# Patient Record
Sex: FEMALE | Race: WHITE | NOT HISPANIC OR LATINO | Employment: OTHER | URBAN - METROPOLITAN AREA
[De-identification: names, ages, dates, MRNs, and addresses within clinical notes are randomized per-mention and may not be internally consistent; named-entity substitution may affect disease eponyms.]

---

## 2020-07-22 ENCOUNTER — HISTORICAL (OUTPATIENT)
Dept: HEMATOLOGY/ONCOLOGY | Facility: CLINIC | Age: 70
End: 2020-07-22

## 2020-07-31 ENCOUNTER — HISTORICAL (OUTPATIENT)
Dept: ADMINISTRATIVE | Facility: HOSPITAL | Age: 70
End: 2020-07-31

## 2020-07-31 LAB
ABS NEUT (OLG): 3.99 X10(3)/MCL (ref 2.1–9.2)
ALBUMIN SERPL-MCNC: 4.5 GM/DL (ref 3.4–4.8)
ALBUMIN/GLOB SERPL: 1.6 RATIO (ref 1.1–2)
ALP SERPL-CCNC: 76 UNIT/L (ref 40–150)
ALT SERPL-CCNC: 17 UNIT/L (ref 0–55)
AST SERPL-CCNC: 16 UNIT/L (ref 5–34)
BASOPHILS # BLD AUTO: 0 X10(3)/MCL (ref 0–0.2)
BASOPHILS NFR BLD AUTO: 0.6 %
BILIRUB SERPL-MCNC: 1.1 MG/DL
BILIRUBIN DIRECT+TOT PNL SERPL-MCNC: 0.3 MG/DL (ref 0–0.5)
BILIRUBIN DIRECT+TOT PNL SERPL-MCNC: 0.8 MG/DL (ref 0–0.8)
BUN SERPL-MCNC: 7.8 MG/DL (ref 9.8–20.1)
CALCIUM SERPL-MCNC: 9.8 MG/DL (ref 8.4–10.2)
CANCER AG125 SERPL-ACNC: 14.6 UNIT/ML (ref 0–35)
CHLORIDE SERPL-SCNC: 103 MMOL/L (ref 98–107)
CO2 SERPL-SCNC: 29 MMOL/L (ref 23–31)
CREAT SERPL-MCNC: 0.66 MG/DL (ref 0.55–1.02)
EOSINOPHIL # BLD AUTO: 0.4 X10(3)/MCL (ref 0–0.9)
EOSINOPHIL NFR BLD AUTO: 6.4 %
ERYTHROCYTE [DISTWIDTH] IN BLOOD BY AUTOMATED COUNT: 12.4 % (ref 11.5–17)
GLOBULIN SER-MCNC: 2.8 GM/DL (ref 2.4–3.5)
GLUCOSE SERPL-MCNC: 106 MG/DL (ref 82–115)
HCT VFR BLD AUTO: 37.4 % (ref 37–47)
HGB BLD-MCNC: 12.2 GM/DL (ref 12–16)
LYMPHOCYTES # BLD AUTO: 1.4 X10(3)/MCL (ref 0.6–4.6)
LYMPHOCYTES NFR BLD AUTO: 22.3 %
MCH RBC QN AUTO: 31 PG (ref 27–31)
MCHC RBC AUTO-ENTMCNC: 32.6 GM/DL (ref 33–36)
MCV RBC AUTO: 95.2 FL (ref 80–94)
MONOCYTES # BLD AUTO: 0.4 X10(3)/MCL (ref 0.1–1.3)
MONOCYTES NFR BLD AUTO: 7 %
NEUTROPHILS # BLD AUTO: 4 X10(3)/MCL (ref 2.1–9.2)
NEUTROPHILS NFR BLD AUTO: 63.5 %
PLATELET # BLD AUTO: 226 X10(3)/MCL (ref 130–400)
PMV BLD AUTO: 10.7 FL (ref 9.4–12.4)
POTASSIUM SERPL-SCNC: 4.1 MMOL/L (ref 3.5–5.1)
PROT SERPL-MCNC: 7.3 GM/DL (ref 5.8–7.6)
RBC # BLD AUTO: 3.93 X10(6)/MCL (ref 4.2–5.4)
SODIUM SERPL-SCNC: 141 MMOL/L (ref 136–145)
WBC # SPEC AUTO: 6.3 X10(3)/MCL (ref 4.5–11.5)

## 2020-08-05 LAB — SARS-COV-2 AG RESP QL IA.RAPID: NEGATIVE

## 2020-08-07 ENCOUNTER — HISTORICAL (OUTPATIENT)
Dept: ADMINISTRATIVE | Facility: HOSPITAL | Age: 70
End: 2020-08-07

## 2020-08-18 ENCOUNTER — HISTORICAL (OUTPATIENT)
Dept: INFUSION THERAPY | Facility: HOSPITAL | Age: 70
End: 2020-08-18

## 2020-08-18 LAB
ABS NEUT (OLG): 4.04 X10(3)/MCL (ref 2.1–9.2)
ANION GAP SERPL CALC-SCNC: 18 MMOL/L
BASOPHILS # BLD AUTO: 0 X10(3)/MCL (ref 0–0.2)
BASOPHILS NFR BLD AUTO: 0.8 %
BUN SERPL-MCNC: 7 MG/DL (ref 8–26)
CHLORIDE SERPL-SCNC: 98 MMOL/L (ref 98–109)
CREAT SERPL-MCNC: 0.6 MG/DL (ref 0.6–1.3)
EOSINOPHIL # BLD AUTO: 0 X10(3)/MCL (ref 0–0.9)
EOSINOPHIL NFR BLD AUTO: 0.2 %
ERYTHROCYTE [DISTWIDTH] IN BLOOD BY AUTOMATED COUNT: 12.6 % (ref 11.5–17)
GLUCOSE SERPL-MCNC: 147 MG/DL (ref 70–105)
HCT VFR BLD AUTO: 35.9 % (ref 37–47)
HCT VFR BLD CALC: 37 % (ref 38–51)
HGB BLD-MCNC: 11.8 GM/DL (ref 12–16)
HGB BLD-MCNC: 12.6 MG/DL (ref 12–17)
LYMPHOCYTES # BLD AUTO: 0.7 X10(3)/MCL (ref 0.6–4.6)
LYMPHOCYTES NFR BLD AUTO: 13.9 %
MCH RBC QN AUTO: 30.8 PG (ref 27–31)
MCHC RBC AUTO-ENTMCNC: 32.9 GM/DL (ref 33–36)
MCV RBC AUTO: 93.7 FL (ref 80–94)
MONOCYTES # BLD AUTO: 0.2 X10(3)/MCL (ref 0.1–1.3)
MONOCYTES NFR BLD AUTO: 4.2 %
NEUTROPHILS # BLD AUTO: 4 X10(3)/MCL (ref 2.1–9.2)
NEUTROPHILS NFR BLD AUTO: 80.1 %
PLATELET # BLD AUTO: 244 X10(3)/MCL (ref 130–400)
PMV BLD AUTO: 10 FL (ref 9.4–12.4)
POC IONIZED CALCIUM: 1.21 MMOL/L (ref 1.12–1.32)
POC TCO2: 25 MMOL/L (ref 24–29)
POTASSIUM BLD-SCNC: 3.6 MMOL/L (ref 3.5–4.9)
RBC # BLD AUTO: 3.83 X10(6)/MCL (ref 4.2–5.4)
SODIUM BLD-SCNC: 136 MMOL/L (ref 138–146)
WBC # SPEC AUTO: 5 X10(3)/MCL (ref 4.5–11.5)

## 2020-08-25 ENCOUNTER — HISTORICAL (OUTPATIENT)
Dept: ADMINISTRATIVE | Facility: HOSPITAL | Age: 70
End: 2020-08-25

## 2020-08-25 LAB
ABS NEUT (OLG): 9.71 X10(3)/MCL (ref 2.1–9.2)
ALBUMIN SERPL-MCNC: 4.2 GM/DL (ref 3.4–5)
ALBUMIN/GLOB SERPL: 1.7 RATIO (ref 1.1–2)
ALP SERPL-CCNC: 110 UNIT/L (ref 40–150)
ALT SERPL-CCNC: 16 UNIT/L (ref 0–55)
AST SERPL-CCNC: 14 UNIT/L (ref 5–34)
BASOPHILS # BLD AUTO: 0 X10(3)/MCL (ref 0–0.2)
BASOPHILS NFR BLD AUTO: 0.3 %
BILIRUB SERPL-MCNC: 0.4 MG/DL
BILIRUBIN DIRECT+TOT PNL SERPL-MCNC: 0.1 MG/DL (ref 0–0.5)
BILIRUBIN DIRECT+TOT PNL SERPL-MCNC: 0.3 MG/DL (ref 0–0.8)
BUN SERPL-MCNC: 7.8 MG/DL (ref 9.8–20.1)
CALCIUM SERPL-MCNC: 8.5 MG/DL (ref 8.4–10.2)
CHLORIDE SERPL-SCNC: 96 MMOL/L (ref 98–107)
CO2 SERPL-SCNC: 28 MMOL/L (ref 23–31)
CREAT SERPL-MCNC: 0.63 MG/DL (ref 0.55–1.02)
EOSINOPHIL # BLD AUTO: 0.7 X10(3)/MCL (ref 0–0.9)
EOSINOPHIL NFR BLD AUTO: 4.7 %
ERYTHROCYTE [DISTWIDTH] IN BLOOD BY AUTOMATED COUNT: 12.9 % (ref 11.5–17)
GLOBULIN SER-MCNC: 2.5 GM/DL (ref 2.4–3.5)
GLUCOSE SERPL-MCNC: 98 MG/DL (ref 82–115)
HCT VFR BLD AUTO: 36 % (ref 37–47)
HGB BLD-MCNC: 12 GM/DL (ref 12–16)
LYMPHOCYTES # BLD AUTO: 1.5 X10(3)/MCL (ref 0.6–4.6)
LYMPHOCYTES NFR BLD AUTO: 10.4 %
MCH RBC QN AUTO: 31.1 PG (ref 27–31)
MCHC RBC AUTO-ENTMCNC: 33.3 GM/DL (ref 33–36)
MCV RBC AUTO: 93.3 FL (ref 80–94)
MONOCYTES # BLD AUTO: 1.9 X10(3)/MCL (ref 0.1–1.3)
MONOCYTES NFR BLD AUTO: 13.4 %
NEUTROPHILS # BLD AUTO: 9.7 X10(3)/MCL (ref 2.1–9.2)
NEUTROPHILS NFR BLD AUTO: 67.7 %
PLATELET # BLD AUTO: 185 X10(3)/MCL (ref 130–400)
PMV BLD AUTO: 10.3 FL (ref 9.4–12.4)
POTASSIUM SERPL-SCNC: 4.1 MMOL/L (ref 3.5–5.1)
PROT SERPL-MCNC: 6.7 GM/DL (ref 5.8–7.6)
RBC # BLD AUTO: 3.86 X10(6)/MCL (ref 4.2–5.4)
SODIUM SERPL-SCNC: 132 MMOL/L (ref 136–145)
WBC # SPEC AUTO: 14.4 X10(3)/MCL (ref 4.5–11.5)

## 2020-09-01 ENCOUNTER — HISTORICAL (OUTPATIENT)
Dept: HEMATOLOGY/ONCOLOGY | Facility: CLINIC | Age: 70
End: 2020-09-01

## 2020-09-01 LAB
ABS NEUT (OLG): 7.99 X10(3)/MCL (ref 2.1–9.2)
ALBUMIN SERPL-MCNC: 4.1 GM/DL (ref 3.4–5)
ALBUMIN/GLOB SERPL: 1.7 RATIO (ref 1.1–2)
ALP SERPL-CCNC: 113 UNIT/L (ref 40–150)
ALT SERPL-CCNC: 21 UNIT/L (ref 0–55)
AST SERPL-CCNC: 15 UNIT/L (ref 5–34)
BASOPHILS # BLD AUTO: 0.1 X10(3)/MCL (ref 0–0.2)
BASOPHILS NFR BLD AUTO: 0.9 %
BILIRUB SERPL-MCNC: 0.5 MG/DL
BILIRUBIN DIRECT+TOT PNL SERPL-MCNC: 0.1 MG/DL (ref 0–0.5)
BILIRUBIN DIRECT+TOT PNL SERPL-MCNC: 0.4 MG/DL (ref 0–0.8)
BUN SERPL-MCNC: 7.3 MG/DL (ref 9.8–20.1)
CALCIUM SERPL-MCNC: 8.6 MG/DL (ref 8.4–10.2)
CHLORIDE SERPL-SCNC: 102 MMOL/L (ref 98–107)
CO2 SERPL-SCNC: 30 MMOL/L (ref 23–31)
CREAT SERPL-MCNC: 0.66 MG/DL (ref 0.55–1.02)
EOSINOPHIL # BLD AUTO: 0.2 X10(3)/MCL (ref 0–0.9)
EOSINOPHIL NFR BLD AUTO: 2 %
ERYTHROCYTE [DISTWIDTH] IN BLOOD BY AUTOMATED COUNT: 13.6 % (ref 11.5–17)
GLOBULIN SER-MCNC: 2.4 GM/DL (ref 2.4–3.5)
GLUCOSE SERPL-MCNC: 79 MG/DL (ref 82–115)
HCT VFR BLD AUTO: 35.6 % (ref 37–47)
HGB BLD-MCNC: 11.7 GM/DL (ref 12–16)
LYMPHOCYTES # BLD AUTO: 1.6 X10(3)/MCL (ref 0.6–4.6)
LYMPHOCYTES NFR BLD AUTO: 14.8 %
MCH RBC QN AUTO: 30.7 PG (ref 27–31)
MCHC RBC AUTO-ENTMCNC: 32.9 GM/DL (ref 33–36)
MCV RBC AUTO: 93.4 FL (ref 80–94)
MONOCYTES # BLD AUTO: 0.6 X10(3)/MCL (ref 0.1–1.3)
MONOCYTES NFR BLD AUTO: 6 %
NEUTROPHILS # BLD AUTO: 8 X10(3)/MCL (ref 2.1–9.2)
NEUTROPHILS NFR BLD AUTO: 75.5 %
PLATELET # BLD AUTO: 168 X10(3)/MCL (ref 130–400)
PMV BLD AUTO: 9.6 FL (ref 9.4–12.4)
POTASSIUM SERPL-SCNC: 4 MMOL/L (ref 3.5–5.1)
PROT SERPL-MCNC: 6.5 GM/DL (ref 5.8–7.6)
RBC # BLD AUTO: 3.81 X10(6)/MCL (ref 4.2–5.4)
SODIUM SERPL-SCNC: 141 MMOL/L (ref 136–145)
WBC # SPEC AUTO: 10.6 X10(3)/MCL (ref 4.5–11.5)

## 2020-09-08 ENCOUNTER — HISTORICAL (OUTPATIENT)
Dept: INFUSION THERAPY | Facility: HOSPITAL | Age: 70
End: 2020-09-08

## 2020-09-08 LAB
ABS NEUT (OLG): 4.63 X10(3)/MCL (ref 2.1–9.2)
ANION GAP SERPL CALC-SCNC: 18 MMOL/L
BASOPHILS # BLD AUTO: 0 X10(3)/MCL (ref 0–0.2)
BASOPHILS NFR BLD AUTO: 0.3 %
BUN SERPL-MCNC: 9 MG/DL (ref 8–26)
CHLORIDE SERPL-SCNC: 100 MMOL/L (ref 98–109)
CREAT SERPL-MCNC: 0.5 MG/DL (ref 0.6–1.3)
ERYTHROCYTE [DISTWIDTH] IN BLOOD BY AUTOMATED COUNT: 13.5 % (ref 11.5–17)
GLUCOSE SERPL-MCNC: 153 MG/DL (ref 70–105)
HCT VFR BLD AUTO: 34.9 % (ref 37–47)
HCT VFR BLD CALC: 38 % (ref 38–51)
HGB BLD-MCNC: 11.6 GM/DL (ref 12–16)
HGB BLD-MCNC: 12.9 MG/DL (ref 12–17)
LYMPHOCYTES # BLD AUTO: 0.9 X10(3)/MCL (ref 0.6–4.6)
LYMPHOCYTES NFR BLD AUTO: 14.9 %
MCH RBC QN AUTO: 30.6 PG (ref 27–31)
MCHC RBC AUTO-ENTMCNC: 33.2 GM/DL (ref 33–36)
MCV RBC AUTO: 92.1 FL (ref 80–94)
MONOCYTES # BLD AUTO: 0.2 X10(3)/MCL (ref 0.1–1.3)
MONOCYTES NFR BLD AUTO: 4.3 %
NEUTROPHILS # BLD AUTO: 4.6 X10(3)/MCL (ref 2.1–9.2)
NEUTROPHILS NFR BLD AUTO: 80.3 %
PLATELET # BLD AUTO: 290 X10(3)/MCL (ref 130–400)
PMV BLD AUTO: 9.8 FL (ref 9.4–12.4)
POC IONIZED CALCIUM: 1.29 MMOL/L (ref 1.12–1.32)
POC TCO2: 25 MMOL/L (ref 24–29)
POTASSIUM BLD-SCNC: 3.8 MMOL/L (ref 3.5–4.9)
RBC # BLD AUTO: 3.79 X10(6)/MCL (ref 4.2–5.4)
SODIUM BLD-SCNC: 138 MMOL/L (ref 138–146)
WBC # SPEC AUTO: 5.8 X10(3)/MCL (ref 4.5–11.5)

## 2020-09-29 ENCOUNTER — HISTORICAL (OUTPATIENT)
Dept: INFUSION THERAPY | Facility: HOSPITAL | Age: 70
End: 2020-09-29

## 2020-09-29 LAB
ABS NEUT (OLG): 3.55 X10(3)/MCL (ref 2.1–9.2)
ANION GAP SERPL CALC-SCNC: 17 MMOL/L
BASOPHILS # BLD AUTO: 0.1 X10(3)/MCL (ref 0–0.2)
BASOPHILS NFR BLD AUTO: 1.1 %
BUN SERPL-MCNC: 8 MG/DL (ref 8–26)
CHLORIDE SERPL-SCNC: 102 MMOL/L (ref 98–109)
CREAT SERPL-MCNC: 0.6 MG/DL (ref 0.6–1.3)
EOSINOPHIL # BLD AUTO: 0.1 X10(3)/MCL (ref 0–0.9)
EOSINOPHIL NFR BLD AUTO: 1.8 %
ERYTHROCYTE [DISTWIDTH] IN BLOOD BY AUTOMATED COUNT: 14.3 % (ref 11.5–17)
GLUCOSE SERPL-MCNC: 103 MG/DL (ref 70–105)
HCT VFR BLD AUTO: 33.4 % (ref 37–47)
HCT VFR BLD CALC: 35 % (ref 38–51)
HGB BLD-MCNC: 11.3 GM/DL (ref 12–16)
HGB BLD-MCNC: 11.9 MG/DL (ref 12–17)
LYMPHOCYTES # BLD AUTO: 1.4 X10(3)/MCL (ref 0.6–4.6)
LYMPHOCYTES NFR BLD AUTO: 25 %
MCH RBC QN AUTO: 31.7 PG (ref 27–31)
MCHC RBC AUTO-ENTMCNC: 33.8 GM/DL (ref 33–36)
MCV RBC AUTO: 93.6 FL (ref 80–94)
MONOCYTES # BLD AUTO: 0.5 X10(3)/MCL (ref 0.1–1.3)
MONOCYTES NFR BLD AUTO: 9.5 %
NEUTROPHILS # BLD AUTO: 3.6 X10(3)/MCL (ref 2.1–9.2)
NEUTROPHILS NFR BLD AUTO: 62.4 %
PLATELET # BLD AUTO: 195 X10(3)/MCL (ref 130–400)
PMV BLD AUTO: 9.5 FL (ref 9.4–12.4)
POC IONIZED CALCIUM: 1.25 MMOL/L (ref 1.12–1.32)
POC TCO2: 26 MMOL/L (ref 24–29)
POTASSIUM BLD-SCNC: 4.3 MMOL/L (ref 3.5–4.9)
RBC # BLD AUTO: 3.57 X10(6)/MCL (ref 4.2–5.4)
SODIUM BLD-SCNC: 140 MMOL/L (ref 138–146)
WBC # SPEC AUTO: 5.7 X10(3)/MCL (ref 4.5–11.5)

## 2020-10-16 ENCOUNTER — HISTORICAL (OUTPATIENT)
Dept: RADIOLOGY | Facility: HOSPITAL | Age: 70
End: 2020-10-16

## 2020-10-20 ENCOUNTER — HISTORICAL (OUTPATIENT)
Dept: INFUSION THERAPY | Facility: HOSPITAL | Age: 70
End: 2020-10-20

## 2020-10-20 LAB
ABS NEUT (OLG): 2.32 X10(3)/MCL (ref 2.1–9.2)
ALBUMIN SERPL-MCNC: 4.2 GM/DL (ref 3.4–4.8)
ALP SERPL-CCNC: 102 UNIT/L (ref 40–150)
ALT SERPL-CCNC: 14 UNIT/L (ref 0–55)
ANION GAP SERPL CALC-SCNC: NORMAL MMOL/L
AST SERPL-CCNC: 16 UNIT/L (ref 5–34)
BASOPHILS # BLD AUTO: 0 X10(3)/MCL (ref 0–0.2)
BASOPHILS NFR BLD AUTO: 1 %
BILIRUB SERPL-MCNC: 0.7 MG/DL
BILIRUBIN DIRECT+TOT PNL SERPL-MCNC: 0.2 MG/DL (ref 0–0.5)
BILIRUBIN DIRECT+TOT PNL SERPL-MCNC: 0.5 MG/DL (ref 0–0.8)
BUN SERPL-MCNC: 8 MG/DL (ref 8–26)
CHLORIDE SERPL-SCNC: NORMAL MMOL/L (ref 98–109)
CREAT SERPL-MCNC: 0.6 MG/DL (ref 0.6–1.3)
EOSINOPHIL # BLD AUTO: 0.1 X10(3)/MCL (ref 0–0.9)
EOSINOPHIL NFR BLD AUTO: 1.8 %
ERYTHROCYTE [DISTWIDTH] IN BLOOD BY AUTOMATED COUNT: 14.6 % (ref 11.5–17)
GLUCOSE SERPL-MCNC: 97 MG/DL (ref 70–105)
HCT VFR BLD AUTO: 34.8 % (ref 37–47)
HCT VFR BLD CALC: NORMAL % (ref 38–51)
HGB BLD-MCNC: 11.6 GM/DL (ref 12–16)
HGB BLD-MCNC: NORMAL MG/DL (ref 12–17)
LIVER PROFILE INTERP: NORMAL
LYMPHOCYTES # BLD AUTO: 1.2 X10(3)/MCL (ref 0.6–4.6)
LYMPHOCYTES NFR BLD AUTO: 28.8 %
MCH RBC QN AUTO: 31.9 PG (ref 27–31)
MCHC RBC AUTO-ENTMCNC: 33.3 GM/DL (ref 33–36)
MCV RBC AUTO: 95.6 FL (ref 80–94)
MONOCYTES # BLD AUTO: 0.4 X10(3)/MCL (ref 0.1–1.3)
MONOCYTES NFR BLD AUTO: 10 %
NEUTROPHILS # BLD AUTO: 2.3 X10(3)/MCL (ref 2.1–9.2)
NEUTROPHILS NFR BLD AUTO: 58.1 %
PLATELET # BLD AUTO: 221 X10(3)/MCL (ref 130–400)
PMV BLD AUTO: 10.1 FL (ref 9.4–12.4)
POC IONIZED CALCIUM: 1.28 MMOL/L (ref 1.12–1.32)
POC TCO2: 26 MMOL/L (ref 24–29)
POTASSIUM BLD-SCNC: 3.6 MMOL/L (ref 3.5–4.9)
PROT SERPL-MCNC: 7 GM/DL (ref 5.8–7.6)
RBC # BLD AUTO: 3.64 X10(6)/MCL (ref 4.2–5.4)
SODIUM BLD-SCNC: 140 MMOL/L (ref 138–146)
WBC # SPEC AUTO: 4 X10(3)/MCL (ref 4.5–11.5)

## 2020-11-11 ENCOUNTER — HISTORICAL (OUTPATIENT)
Dept: INFUSION THERAPY | Facility: HOSPITAL | Age: 70
End: 2020-11-11

## 2020-11-11 LAB
ABS NEUT (OLG): 3.02 X10(3)/MCL (ref 2.1–9.2)
ANION GAP SERPL CALC-SCNC: 17 MMOL/L
BASOPHILS # BLD AUTO: 0 X10(3)/MCL (ref 0–0.2)
BASOPHILS NFR BLD AUTO: 1 %
BUN SERPL-MCNC: 8 MG/DL (ref 8–26)
CANCER AG125 SERPL-ACNC: 7 UNIT/ML (ref 0–35)
CHLORIDE SERPL-SCNC: 98 MMOL/L (ref 98–109)
CREAT SERPL-MCNC: 0.6 MG/DL (ref 0.6–1.3)
EOSINOPHIL # BLD AUTO: 0.1 X10(3)/MCL (ref 0–0.9)
EOSINOPHIL NFR BLD AUTO: 1.7 %
ERYTHROCYTE [DISTWIDTH] IN BLOOD BY AUTOMATED COUNT: 14.2 % (ref 11.5–17)
GLUCOSE SERPL-MCNC: 139 MG/DL (ref 70–105)
HCT VFR BLD AUTO: 31.6 % (ref 37–47)
HCT VFR BLD CALC: 33 % (ref 38–51)
HGB BLD-MCNC: 10.7 GM/DL (ref 12–16)
HGB BLD-MCNC: 11.2 MG/DL (ref 12–17)
LYMPHOCYTES # BLD AUTO: 1.2 X10(3)/MCL (ref 0.6–4.6)
LYMPHOCYTES NFR BLD AUTO: 24.7 %
MAGNESIUM SERPL-MCNC: 1.9 MG/DL (ref 1.6–2.6)
MCH RBC QN AUTO: 32.9 PG (ref 27–31)
MCHC RBC AUTO-ENTMCNC: 33.9 GM/DL (ref 33–36)
MCV RBC AUTO: 97.2 FL (ref 80–94)
MONOCYTES # BLD AUTO: 0.5 X10(3)/MCL (ref 0.1–1.3)
MONOCYTES NFR BLD AUTO: 9.8 %
NEUTROPHILS # BLD AUTO: 3 X10(3)/MCL (ref 2.1–9.2)
NEUTROPHILS NFR BLD AUTO: 62.8 %
PHOSPHATE SERPL-MCNC: 3.9 MG/DL (ref 2.3–4.7)
PLATELET # BLD AUTO: 193 X10(3)/MCL (ref 130–400)
PMV BLD AUTO: 10.1 FL (ref 9.4–12.4)
POC IONIZED CALCIUM: 1.27 MMOL/L (ref 1.12–1.32)
POC TCO2: 28 MMOL/L (ref 24–29)
POTASSIUM BLD-SCNC: 3.4 MMOL/L (ref 3.5–4.9)
RBC # BLD AUTO: 3.25 X10(6)/MCL (ref 4.2–5.4)
SODIUM BLD-SCNC: 139 MMOL/L (ref 138–146)
WBC # SPEC AUTO: 4.8 X10(3)/MCL (ref 4.5–11.5)

## 2020-12-01 ENCOUNTER — HISTORICAL (OUTPATIENT)
Dept: ADMINISTRATIVE | Facility: HOSPITAL | Age: 70
End: 2020-12-01

## 2020-12-01 LAB
ABS NEUT (OLG): 4.79 X10(3)/MCL (ref 2.1–9.2)
ALBUMIN SERPL-MCNC: 4.1 GM/DL (ref 3.4–4.8)
ALBUMIN/GLOB SERPL: 1.7 RATIO (ref 1.1–2)
ALP SERPL-CCNC: 94 UNIT/L (ref 40–150)
ALT SERPL-CCNC: 17 UNIT/L (ref 0–55)
AST SERPL-CCNC: 17 UNIT/L (ref 5–34)
BASOPHILS # BLD AUTO: 0 X10(3)/MCL (ref 0–0.2)
BASOPHILS NFR BLD AUTO: 0.6 %
BILIRUB SERPL-MCNC: 0.9 MG/DL
BILIRUBIN DIRECT+TOT PNL SERPL-MCNC: 0.3 MG/DL (ref 0–0.5)
BILIRUBIN DIRECT+TOT PNL SERPL-MCNC: 0.6 MG/DL (ref 0–0.8)
BUN SERPL-MCNC: 8.4 MG/DL (ref 9.8–20.1)
CALCIUM SERPL-MCNC: 9.2 MG/DL (ref 8.4–10.2)
CANCER AG125 SERPL-ACNC: 6.8 UNIT/ML (ref 0–35)
CHLORIDE SERPL-SCNC: 105 MMOL/L (ref 98–107)
CO2 SERPL-SCNC: 29 MMOL/L (ref 23–31)
CREAT SERPL-MCNC: 0.66 MG/DL (ref 0.55–1.02)
EOSINOPHIL # BLD AUTO: 0 X10(3)/MCL (ref 0–0.9)
EOSINOPHIL NFR BLD AUTO: 0.8 %
ERYTHROCYTE [DISTWIDTH] IN BLOOD BY AUTOMATED COUNT: 13.5 % (ref 11.5–17)
GLOBULIN SER-MCNC: 2.4 GM/DL (ref 2.4–3.5)
GLUCOSE SERPL-MCNC: 120 MG/DL (ref 82–115)
HCT VFR BLD AUTO: 31.3 % (ref 37–47)
HGB BLD-MCNC: 10.4 GM/DL (ref 12–16)
LYMPHOCYTES # BLD AUTO: 1.3 X10(3)/MCL (ref 0.6–4.6)
LYMPHOCYTES NFR BLD AUTO: 19.1 %
MCH RBC QN AUTO: 32.9 PG (ref 27–31)
MCHC RBC AUTO-ENTMCNC: 33.2 GM/DL (ref 33–36)
MCV RBC AUTO: 99.1 FL (ref 80–94)
MONOCYTES # BLD AUTO: 0.4 X10(3)/MCL (ref 0.1–1.3)
MONOCYTES NFR BLD AUTO: 6.8 %
NEUTROPHILS # BLD AUTO: 4.8 X10(3)/MCL (ref 2.1–9.2)
NEUTROPHILS NFR BLD AUTO: 72.5 %
PLATELET # BLD AUTO: 196 X10(3)/MCL (ref 130–400)
PMV BLD AUTO: 9.4 FL (ref 9.4–12.4)
POTASSIUM SERPL-SCNC: 3.7 MMOL/L (ref 3.5–5.1)
PROT SERPL-MCNC: 6.5 GM/DL (ref 5.8–7.6)
RBC # BLD AUTO: 3.16 X10(6)/MCL (ref 4.2–5.4)
SODIUM SERPL-SCNC: 142 MMOL/L (ref 136–145)
WBC # SPEC AUTO: 6.6 X10(3)/MCL (ref 4.5–11.5)

## 2020-12-02 ENCOUNTER — HISTORICAL (OUTPATIENT)
Dept: INFUSION THERAPY | Facility: HOSPITAL | Age: 70
End: 2020-12-02

## 2020-12-18 ENCOUNTER — HISTORICAL (OUTPATIENT)
Dept: ADMINISTRATIVE | Facility: HOSPITAL | Age: 70
End: 2020-12-18

## 2020-12-22 ENCOUNTER — HISTORICAL (OUTPATIENT)
Dept: ADMINISTRATIVE | Facility: HOSPITAL | Age: 70
End: 2020-12-22

## 2020-12-22 LAB
ABS NEUT (OLG): 3.21 X10(3)/MCL (ref 2.1–9.2)
ALBUMIN SERPL-MCNC: 4.3 GM/DL (ref 3.4–4.8)
ALP SERPL-CCNC: 101 UNIT/L (ref 40–150)
ALT SERPL-CCNC: 14 UNIT/L (ref 0–55)
ANION GAP SERPL CALC-SCNC: 16 MMOL/L
AST SERPL-CCNC: 16 UNIT/L (ref 5–34)
BASOPHILS # BLD AUTO: 0 X10(3)/MCL (ref 0–0.2)
BASOPHILS NFR BLD AUTO: 0.8 %
BILIRUB SERPL-MCNC: 0.4 MG/DL
BILIRUBIN DIRECT+TOT PNL SERPL-MCNC: 0.2 MG/DL (ref 0–0.5)
BILIRUBIN DIRECT+TOT PNL SERPL-MCNC: 0.2 MG/DL (ref 0–0.8)
BUN SERPL-MCNC: 9 MG/DL (ref 8–26)
CHLORIDE SERPL-SCNC: 103 MMOL/L (ref 98–109)
CREAT SERPL-MCNC: 0.5 MG/DL (ref 0.6–1.3)
EOSINOPHIL # BLD AUTO: 0.1 X10(3)/MCL (ref 0–0.9)
EOSINOPHIL NFR BLD AUTO: 1.1 %
ERYTHROCYTE [DISTWIDTH] IN BLOOD BY AUTOMATED COUNT: 12.7 % (ref 11.5–17)
GLUCOSE SERPL-MCNC: 110 MG/DL (ref 70–105)
HCT VFR BLD AUTO: 32.7 % (ref 37–47)
HCT VFR BLD CALC: 33 % (ref 38–51)
HGB BLD-MCNC: 10.7 GM/DL (ref 12–16)
HGB BLD-MCNC: 11.2 MG/DL (ref 12–17)
LIVER PROFILE INTERP: NORMAL
LYMPHOCYTES # BLD AUTO: 1.5 X10(3)/MCL (ref 0.6–4.6)
LYMPHOCYTES NFR BLD AUTO: 28.4 %
MCH RBC QN AUTO: 32.8 PG (ref 27–31)
MCHC RBC AUTO-ENTMCNC: 32.7 GM/DL (ref 33–36)
MCV RBC AUTO: 100.3 FL (ref 80–94)
MONOCYTES # BLD AUTO: 0.5 X10(3)/MCL (ref 0.1–1.3)
MONOCYTES NFR BLD AUTO: 8.7 %
NEUTROPHILS # BLD AUTO: 3.2 X10(3)/MCL (ref 2.1–9.2)
NEUTROPHILS NFR BLD AUTO: 60.8 %
PLATELET # BLD AUTO: 248 X10(3)/MCL (ref 130–400)
PMV BLD AUTO: 9.4 FL (ref 9.4–12.4)
POC IONIZED CALCIUM: 1.27 MMOL/L (ref 1.12–1.32)
POC TCO2: 27 MMOL/L (ref 24–29)
POTASSIUM BLD-SCNC: 3.6 MMOL/L (ref 3.5–4.9)
PROT SERPL-MCNC: 6.8 GM/DL (ref 5.8–7.6)
RBC # BLD AUTO: 3.26 X10(6)/MCL (ref 4.2–5.4)
SODIUM BLD-SCNC: 141 MMOL/L (ref 138–146)
WBC # SPEC AUTO: 5.3 X10(3)/MCL (ref 4.5–11.5)

## 2021-01-21 ENCOUNTER — HISTORICAL (OUTPATIENT)
Dept: INFUSION THERAPY | Facility: HOSPITAL | Age: 71
End: 2021-01-21

## 2021-02-23 ENCOUNTER — HISTORICAL (OUTPATIENT)
Dept: HEMATOLOGY/ONCOLOGY | Facility: CLINIC | Age: 71
End: 2021-02-23

## 2021-02-23 LAB
ABS NEUT (OLG): 3.25 X10(3)/MCL (ref 2.1–9.2)
ALBUMIN SERPL-MCNC: 4.4 GM/DL (ref 3.4–4.8)
ALBUMIN/GLOB SERPL: 1.6 RATIO (ref 1.1–2)
ALP SERPL-CCNC: 90 UNIT/L (ref 40–150)
ALT SERPL-CCNC: 19 UNIT/L (ref 0–55)
AST SERPL-CCNC: 21 UNIT/L (ref 5–34)
BASOPHILS # BLD AUTO: 0 X10(3)/MCL (ref 0–0.2)
BASOPHILS NFR BLD AUTO: 0.7 %
BILIRUB SERPL-MCNC: 0.8 MG/DL
BILIRUBIN DIRECT+TOT PNL SERPL-MCNC: 0.3 MG/DL (ref 0–0.5)
BILIRUBIN DIRECT+TOT PNL SERPL-MCNC: 0.5 MG/DL (ref 0–0.8)
BUN SERPL-MCNC: 11.8 MG/DL (ref 9.8–20.1)
CALCIUM SERPL-MCNC: 9.7 MG/DL (ref 8.4–10.2)
CANCER AG125 SERPL-ACNC: 5 UNIT/ML (ref 0–35)
CHLORIDE SERPL-SCNC: 101 MMOL/L (ref 98–107)
CO2 SERPL-SCNC: 32 MMOL/L (ref 23–31)
CREAT SERPL-MCNC: 0.73 MG/DL (ref 0.55–1.02)
EOSINOPHIL # BLD AUTO: 0.5 X10(3)/MCL (ref 0–0.9)
EOSINOPHIL NFR BLD AUTO: 7.8 %
ERYTHROCYTE [DISTWIDTH] IN BLOOD BY AUTOMATED COUNT: 11.5 % (ref 11.5–17)
GLOBULIN SER-MCNC: 2.7 GM/DL (ref 2.4–3.5)
GLUCOSE SERPL-MCNC: 100 MG/DL (ref 82–115)
HCT VFR BLD AUTO: 34.6 % (ref 37–47)
HGB BLD-MCNC: 11.7 GM/DL (ref 12–16)
LYMPHOCYTES # BLD AUTO: 1.6 X10(3)/MCL (ref 0.6–4.6)
LYMPHOCYTES NFR BLD AUTO: 27.5 %
MCH RBC QN AUTO: 32.4 PG (ref 27–31)
MCHC RBC AUTO-ENTMCNC: 33.8 GM/DL (ref 33–36)
MCV RBC AUTO: 95.8 FL (ref 80–94)
MONOCYTES # BLD AUTO: 0.5 X10(3)/MCL (ref 0.1–1.3)
MONOCYTES NFR BLD AUTO: 8.7 %
NEUTROPHILS # BLD AUTO: 3.2 X10(3)/MCL (ref 2.1–9.2)
NEUTROPHILS NFR BLD AUTO: 55.1 %
PLATELET # BLD AUTO: 198 X10(3)/MCL (ref 130–400)
PMV BLD AUTO: 9.6 FL (ref 9.4–12.4)
POTASSIUM SERPL-SCNC: 3.7 MMOL/L (ref 3.5–5.1)
PROT SERPL-MCNC: 7.1 GM/DL (ref 5.8–7.6)
RBC # BLD AUTO: 3.61 X10(6)/MCL (ref 4.2–5.4)
SODIUM SERPL-SCNC: 142 MMOL/L (ref 136–145)
WBC # SPEC AUTO: 5.9 X10(3)/MCL (ref 4.5–11.5)

## 2021-03-11 ENCOUNTER — HISTORICAL (OUTPATIENT)
Dept: RADIOLOGY | Facility: HOSPITAL | Age: 71
End: 2021-03-11

## 2021-03-19 ENCOUNTER — HISTORICAL (OUTPATIENT)
Dept: INFUSION THERAPY | Facility: HOSPITAL | Age: 71
End: 2021-03-19

## 2021-04-01 ENCOUNTER — HISTORICAL (OUTPATIENT)
Dept: HEMATOLOGY/ONCOLOGY | Facility: CLINIC | Age: 71
End: 2021-04-01

## 2021-04-01 LAB
ABS NEUT (OLG): 1.49 X10(3)/MCL (ref 2.1–9.2)
ALBUMIN SERPL-MCNC: 4.5 GM/DL (ref 3.4–4.8)
ALBUMIN/GLOB SERPL: 1.9 RATIO (ref 1.1–2)
ALP SERPL-CCNC: 84 UNIT/L (ref 40–150)
ALT SERPL-CCNC: 15 UNIT/L (ref 0–55)
AST SERPL-CCNC: 21 UNIT/L (ref 5–34)
BASOPHILS # BLD AUTO: 0 X10(3)/MCL (ref 0–0.2)
BASOPHILS NFR BLD AUTO: 0.3 %
BILIRUB SERPL-MCNC: 0.9 MG/DL
BILIRUBIN DIRECT+TOT PNL SERPL-MCNC: 0.3 MG/DL (ref 0–0.5)
BILIRUBIN DIRECT+TOT PNL SERPL-MCNC: 0.6 MG/DL (ref 0–0.8)
BUN SERPL-MCNC: 8.6 MG/DL (ref 9.8–20.1)
CALCIUM SERPL-MCNC: 9.6 MG/DL (ref 8.4–10.2)
CHLORIDE SERPL-SCNC: 103 MMOL/L (ref 98–107)
CO2 SERPL-SCNC: 30 MMOL/L (ref 23–31)
CREAT SERPL-MCNC: 0.71 MG/DL (ref 0.55–1.02)
EOSINOPHIL # BLD AUTO: 0.2 X10(3)/MCL (ref 0–0.9)
EOSINOPHIL NFR BLD AUTO: 7.1 %
ERYTHROCYTE [DISTWIDTH] IN BLOOD BY AUTOMATED COUNT: 11.9 % (ref 11.5–17)
FERRITIN SERPL-MCNC: 185.24 NG/ML (ref 4.63–204)
FOLATE SERPL-MCNC: 9.8 NG/ML (ref 7–31.4)
GLOBULIN SER-MCNC: 2.4 GM/DL (ref 2.4–3.5)
GLUCOSE SERPL-MCNC: 78 MG/DL (ref 82–115)
HCT VFR BLD AUTO: 47.6 % (ref 37–47)
HGB BLD-MCNC: 15.7 GM/DL (ref 12–16)
IRON SERPL-MCNC: 91 UG/DL (ref 50–170)
LYMPHOCYTES # BLD AUTO: 1 X10(3)/MCL (ref 0.6–4.6)
LYMPHOCYTES NFR BLD AUTO: 34.5 %
MCH RBC QN AUTO: 31.3 PG (ref 27–31)
MCHC RBC AUTO-ENTMCNC: 33 GM/DL (ref 33–36)
MCV RBC AUTO: 94.8 FL (ref 80–94)
MONOCYTES # BLD AUTO: 0.2 X10(3)/MCL (ref 0.1–1.3)
MONOCYTES NFR BLD AUTO: 7.8 %
NEUTROPHILS # BLD AUTO: 1.5 X10(3)/MCL (ref 2.1–9.2)
NEUTROPHILS NFR BLD AUTO: 50.3 %
PLATELET # BLD AUTO: 120 X10(3)/MCL (ref 130–400)
PMV BLD AUTO: 9.8 FL (ref 9.4–12.4)
POTASSIUM SERPL-SCNC: 3.9 MMOL/L (ref 3.5–5.1)
PROT SERPL-MCNC: 6.9 GM/DL (ref 5.8–7.6)
RBC # BLD AUTO: 5.02 X10(6)/MCL (ref 4.2–5.4)
SODIUM SERPL-SCNC: 142 MMOL/L (ref 136–145)
VIT B12 SERPL-MCNC: 368 PG/ML (ref 213–816)
WBC # SPEC AUTO: 3 X10(3)/MCL (ref 4.5–11.5)

## 2021-04-12 ENCOUNTER — HISTORICAL (OUTPATIENT)
Dept: INFUSION THERAPY | Facility: HOSPITAL | Age: 71
End: 2021-04-12

## 2021-04-26 LAB — CRC RECOMMENDATION EXT: NORMAL

## 2021-05-14 ENCOUNTER — HISTORICAL (OUTPATIENT)
Dept: INFUSION THERAPY | Facility: HOSPITAL | Age: 71
End: 2021-05-14

## 2021-05-14 LAB
ABS NEUT (OLG): 2.06 X10(3)/MCL (ref 2.1–9.2)
ALBUMIN SERPL-MCNC: 4.2 GM/DL (ref 3.4–4.8)
ALBUMIN/GLOB SERPL: 1.8 RATIO (ref 1.1–2)
ALP SERPL-CCNC: 78 UNIT/L (ref 40–150)
ALT SERPL-CCNC: 14 UNIT/L (ref 0–55)
AST SERPL-CCNC: 17 UNIT/L (ref 5–34)
BASOPHILS # BLD AUTO: 0 X10(3)/MCL (ref 0–0.2)
BASOPHILS NFR BLD AUTO: 0.7 %
BILIRUB SERPL-MCNC: 1.1 MG/DL
BILIRUBIN DIRECT+TOT PNL SERPL-MCNC: 0.3 MG/DL (ref 0–0.5)
BILIRUBIN DIRECT+TOT PNL SERPL-MCNC: 0.8 MG/DL (ref 0–0.8)
BUN SERPL-MCNC: 8.2 MG/DL (ref 9.8–20.1)
CALCIUM SERPL-MCNC: 8.9 MG/DL (ref 8.4–10.2)
CHLORIDE SERPL-SCNC: 104 MMOL/L (ref 98–107)
CO2 SERPL-SCNC: 27 MMOL/L (ref 23–31)
CREAT SERPL-MCNC: 0.64 MG/DL (ref 0.55–1.02)
EOSINOPHIL # BLD AUTO: 0.3 X10(3)/MCL (ref 0–0.9)
EOSINOPHIL NFR BLD AUTO: 6.5 %
ERYTHROCYTE [DISTWIDTH] IN BLOOD BY AUTOMATED COUNT: 12.6 % (ref 11.5–17)
FOLATE SERPL-MCNC: 10.1 NG/ML (ref 7–31.4)
GLOBULIN SER-MCNC: 2.4 GM/DL (ref 2.4–3.5)
GLUCOSE SERPL-MCNC: 98 MG/DL (ref 82–115)
HCT VFR BLD AUTO: 33.9 % (ref 37–47)
HGB BLD-MCNC: 11.3 GM/DL (ref 12–16)
LYMPHOCYTES # BLD AUTO: 1.5 X10(3)/MCL (ref 0.6–4.6)
LYMPHOCYTES NFR BLD AUTO: 35.3 %
MCH RBC QN AUTO: 31.6 PG (ref 27–31)
MCHC RBC AUTO-ENTMCNC: 33.3 GM/DL (ref 33–36)
MCV RBC AUTO: 94.7 FL (ref 80–94)
MONOCYTES # BLD AUTO: 0.4 X10(3)/MCL (ref 0.1–1.3)
MONOCYTES NFR BLD AUTO: 9.9 %
NEUTROPHILS # BLD AUTO: 2.1 X10(3)/MCL (ref 2.1–9.2)
NEUTROPHILS NFR BLD AUTO: 47.6 %
PLATELET # BLD AUTO: 185 X10(3)/MCL (ref 130–400)
PMV BLD AUTO: 9.7 FL (ref 9.4–12.4)
POTASSIUM SERPL-SCNC: 4 MMOL/L (ref 3.5–5.1)
PROT SERPL-MCNC: 6.6 GM/DL (ref 5.8–7.6)
RBC # BLD AUTO: 3.58 X10(6)/MCL (ref 4.2–5.4)
SODIUM SERPL-SCNC: 140 MMOL/L (ref 136–145)
TSH SERPL-ACNC: 2.01 UIU/ML (ref 0.35–4.94)
VIT B12 SERPL-MCNC: 404 PG/ML (ref 213–816)
WBC # SPEC AUTO: 4.3 X10(3)/MCL (ref 4.5–11.5)

## 2021-05-18 ENCOUNTER — HISTORICAL (OUTPATIENT)
Dept: RADIOLOGY | Facility: HOSPITAL | Age: 71
End: 2021-05-18

## 2021-06-08 ENCOUNTER — HISTORICAL (OUTPATIENT)
Dept: RADIOLOGY | Facility: HOSPITAL | Age: 71
End: 2021-06-08

## 2021-06-11 ENCOUNTER — HISTORICAL (OUTPATIENT)
Dept: INFUSION THERAPY | Facility: HOSPITAL | Age: 71
End: 2021-06-11

## 2021-07-09 ENCOUNTER — HISTORICAL (OUTPATIENT)
Dept: INFUSION THERAPY | Facility: HOSPITAL | Age: 71
End: 2021-07-09

## 2021-08-06 ENCOUNTER — HISTORICAL (OUTPATIENT)
Dept: INFUSION THERAPY | Facility: HOSPITAL | Age: 71
End: 2021-08-06

## 2021-09-03 ENCOUNTER — HISTORICAL (OUTPATIENT)
Dept: INFUSION THERAPY | Facility: HOSPITAL | Age: 71
End: 2021-09-03

## 2021-10-01 ENCOUNTER — HISTORICAL (OUTPATIENT)
Dept: INFUSION THERAPY | Facility: HOSPITAL | Age: 71
End: 2021-10-01

## 2021-10-29 ENCOUNTER — HISTORICAL (OUTPATIENT)
Dept: ADMINISTRATIVE | Facility: HOSPITAL | Age: 71
End: 2021-10-29

## 2021-10-29 LAB
ABS NEUT (OLG): 2.66 X10(3)/MCL (ref 2.1–9.2)
ALBUMIN SERPL-MCNC: 4.4 GM/DL (ref 3.4–4.8)
ALBUMIN/GLOB SERPL: 1.8 RATIO (ref 1.1–2)
ALP SERPL-CCNC: 43 UNIT/L (ref 40–150)
ALT SERPL-CCNC: 21 UNIT/L (ref 0–55)
AST SERPL-CCNC: 28 UNIT/L (ref 5–34)
BASOPHILS # BLD AUTO: 0 X10(3)/MCL (ref 0–0.2)
BASOPHILS NFR BLD AUTO: 0.8 %
BILIRUB SERPL-MCNC: 1 MG/DL
BILIRUBIN DIRECT+TOT PNL SERPL-MCNC: 0.3 MG/DL (ref 0–0.5)
BILIRUBIN DIRECT+TOT PNL SERPL-MCNC: 0.7 MG/DL (ref 0–0.8)
BUN SERPL-MCNC: 8.2 MG/DL (ref 9.8–20.1)
CALCIUM SERPL-MCNC: 9.6 MG/DL (ref 8.7–10.5)
CANCER AG125 SERPL-ACNC: 7.4 UNIT/ML (ref 0–35)
CHLORIDE SERPL-SCNC: 105 MMOL/L (ref 98–107)
CO2 SERPL-SCNC: 28 MMOL/L (ref 23–31)
CREAT SERPL-MCNC: 0.72 MG/DL (ref 0.55–1.02)
EOSINOPHIL # BLD AUTO: 0.3 X10(3)/MCL (ref 0–0.9)
EOSINOPHIL NFR BLD AUTO: 5.6 %
ERYTHROCYTE [DISTWIDTH] IN BLOOD BY AUTOMATED COUNT: 12.2 % (ref 11.5–17)
GLOBULIN SER-MCNC: 2.5 GM/DL (ref 2.4–3.5)
GLUCOSE SERPL-MCNC: 102 MG/DL (ref 82–115)
HCT VFR BLD AUTO: 36.6 % (ref 37–47)
HGB BLD-MCNC: 12 GM/DL (ref 12–16)
LYMPHOCYTES # BLD AUTO: 1.5 X10(3)/MCL (ref 0.6–4.6)
LYMPHOCYTES NFR BLD AUTO: 30.3 %
MCH RBC QN AUTO: 32.3 PG (ref 27–31)
MCHC RBC AUTO-ENTMCNC: 32.8 GM/DL (ref 33–36)
MCV RBC AUTO: 98.4 FL (ref 80–94)
MONOCYTES # BLD AUTO: 0.4 X10(3)/MCL (ref 0.1–1.3)
MONOCYTES NFR BLD AUTO: 8.2 %
NEUTROPHILS # BLD AUTO: 2.7 X10(3)/MCL (ref 2.1–9.2)
NEUTROPHILS NFR BLD AUTO: 54.9 %
PLATELET # BLD AUTO: 189 X10(3)/MCL (ref 130–400)
PMV BLD AUTO: 10.2 FL (ref 9.4–12.4)
POC CREATININE: 0.7 MG/DL (ref 0.6–1.3)
POTASSIUM SERPL-SCNC: 4.4 MMOL/L (ref 3.5–5.1)
PROT SERPL-MCNC: 6.9 GM/DL (ref 5.8–7.6)
RBC # BLD AUTO: 3.72 X10(6)/MCL (ref 4.2–5.4)
SODIUM SERPL-SCNC: 139 MMOL/L (ref 136–145)
VIT B12 SERPL-MCNC: 787 PG/ML (ref 213–816)
WBC # SPEC AUTO: 4.8 X10(3)/MCL (ref 4.5–11.5)

## 2022-03-02 ENCOUNTER — HISTORICAL (OUTPATIENT)
Dept: ADMINISTRATIVE | Facility: HOSPITAL | Age: 72
End: 2022-03-02

## 2022-03-16 ENCOUNTER — HISTORICAL (OUTPATIENT)
Dept: ADMINISTRATIVE | Facility: HOSPITAL | Age: 72
End: 2022-03-16

## 2022-04-01 ENCOUNTER — HISTORICAL (OUTPATIENT)
Dept: INFUSION THERAPY | Facility: HOSPITAL | Age: 72
End: 2022-04-01

## 2022-04-28 DIAGNOSIS — E53.8 B12 DEFICIENCY: Primary | ICD-10-CM

## 2022-04-28 DIAGNOSIS — D05.11 DUCTAL CARCINOMA IN SITU (DCIS) OF RIGHT BREAST: ICD-10-CM

## 2022-04-28 DIAGNOSIS — R91.8 PULMONARY NODULES/LESIONS, MULTIPLE: ICD-10-CM

## 2022-04-28 DIAGNOSIS — C55 MALIGNANT NEOPLASM OF UTERUS, UNSPECIFIED SITE: ICD-10-CM

## 2022-04-29 DIAGNOSIS — Z95.828 ACQUIRED PORTAL-SYSTEMIC SHUNT: Primary | ICD-10-CM

## 2022-04-29 DIAGNOSIS — R91.8 PULMONARY NODULES/LESIONS, MULTIPLE: ICD-10-CM

## 2022-04-29 DIAGNOSIS — E53.8 VITAMIN B 12 DEFICIENCY: ICD-10-CM

## 2022-04-29 DIAGNOSIS — D05.11 DUCTAL CARCINOMA IN SITU OF RIGHT BREAST: ICD-10-CM

## 2022-04-29 DIAGNOSIS — C54.1 ENDOMETRIAL CANCER: ICD-10-CM

## 2022-04-30 NOTE — OP NOTE
Richardson Rojo MD      Patient:   Kim Bah            MRN: [id number]            FIN: [id number]               Age:   69 years     Sex:  Female     :  1950   Associated Diagnoses:   DCIS (ductal carcinoma in situ) of breast   Author:   Richardson Rojo MD      Operative Note   Operative Information   Procedures Performed: right Breast ultrasound-guided wire localization and lumpectomy, mediport placement.     Preoperative Diagnosis: DCIS (ductal carcinoma in situ) of breast (ELQ81-YG D05.10).     Postoperative Diagnosis: DCIS (ductal carcinoma in situ) of breast (IMI55-QL D05.10).     Surgeon: Richardson Rojo MD.     Anesthesia: Gen. endotracheal anesthesia.     Speciman Removed: Lumpectomy.     Description of Procedure/Findings/    Complications: After informed consent the patient was brought to the operating room and placed in the supine position.  General endotracheal anesthesia was administered without difficulty.  The breast was prepped and draped in sterile fashion.  A focused left breast ultrasound was performed.  The hydro-maureen was clearly visualized at the biopsy site.  Under ultrasound guidance by performed wire localization of the hydro-maureen.  Ultrasound was then used to guide incision placement and lumpectomy.  An incision was made with a scalpel over the area of concern.  Carried down through the subcutaneous tissue using Bovie cautery.  The area of concern and area around the wire was excised circumferentially using Bovie cautery with a rim of normal breast tissue.  The specimen was marked with suture long lateral and short superior.  Specimen radiograph/ultrasound was performed intraoperatively and the hydro-maureen was clearly visible within the specimen.  Margins appeared adequate.  The specimen was sent for histopathological examination.  The wound was irrigated with antimicrobial solution.  Meticulous hemostasis was achieved.  It was closed in multiple layers with absorbable suture.   Sterile dressings were applied.  After infiltration with local anesthesia, the left subclavian vein was cannulated with a large-bore needle and a guidewire was placed under fluoroscopic guidance into the superior vena cava.  An incision was made below the wire insertion site and a pocket was created for the port over the pectoralis fascia.  A PowerPort was soaked in antimicrobial solution, it was assembled and flushed.  It was placed in the pocket and the catheter was tunneled to the wire insertion site without difficulty.  The catheter was cut to size using fluoroscopic guidance.  An introducer dilator was placed over the guidewire under fluoroscopic vision and the catheter was threaded through the peel-away introducer without difficulty.  Final fluoroscopy revealed the tip of the catheter in good position.  The port flushed and aspirated freely, a final heparin solution was instilled.  The port pocket was irrigated with antimicrobial solution, meticulous hemostasis was achieved, it was closed in multiple layers with absorbable suture.  Sterile dressings were applied.   The patient tolerated the procedure well..     Findings: Intraoperative ultrasound revealed the hydro-maureen at the area of concern/previous biopsy area and wire localization was performed intraoperatively followed by wide local excision.  Specimen radiograph/ultrasound confirmed a marking clip within the specimen.     Complications: None.

## 2022-04-30 NOTE — PROGRESS NOTES
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      DCIS, right breast, 20    Current Treatment: Adjuvant Carboplatin and taxol (20)    Treatment History:  20: right breast biopsy   Pathology: DCIS ER 96%CT 87%  6/10/20: CT A/P- 5.4 x 3.9 enhancing focus within the endometrial canal, small scattered hepatic cysts, aneurysmal dilatation of the left distal common iliac artery  20: Total hysterectomy, bilateral salpingo-oophorectomy, hysterectomy lymphadenectomy, peritoneal washings, omental biopsy   Pathology: Poorly differentiated carcinoma with clear cell features, High-grade    Arises within an endometrial polyp, 5.1 cm    Omental biopsy negative    Lymph nodes negative    Peritoneal washings negative8/10/20: Completed vaginal brachytherapy   20:Genetic Testing: no clinical significant variant detected, no variants of unknown significance  2020: Lumpectomy right breast 1 o'clock position   Pathology: Ductal carcinoma in situ grade 2 with focal comedonecrosis, no invasive malignancy, involves the posterior margin and anterior superior medial margins,   ER 96%CT 87%    Clinical History:    This patient was noted to have postmenopausal bleeding. About the end of 2020. she had an ultrasound finding of an endometrial polyp and some thickening. In May 2020 she was recommended to have a hysteroscopy and D&C. The biopsy from the D&C showed a undifferentiated carcinoma with clear cell features and a question of sarcomatoid features FIGO grade 3.  She was then seen and evaluated by GYN oncology. Underwent surgery on .  Prior to that she was  also noted to have a right breast lesion. She was seen by Dr. Richardson Rojo. He was awaiting her genetic counseling report. And treatment of her breast cancer before making another decision regarding her DCIS. Biopsy was consistent with DCIS. She was also sent for genetic counseling. Final reports are pending as of July 30.      Interval History   9/8/20 Patient presents for  C2 D1 of adjuvant carboplatin and Taxol on 8/18/20. She did really well. She did have some constipation, but managed with stool softeners and MOM. No N/V, no fever, SOB, no neuropathy, no abdominal pain. She continued to walk her 2-3 miles, drinks alot of water. Her labs are adequate     Laboratory review throughout the cycle shows mild anemia at 11.6, her platelets are stable.  She did not get neutropenic.  She received G-CSF.  He is here today for cycle 2 of treatment      Review of Systems   Constitutional:  Fatigue, No fever, No chills.    Eye:  No visual disturbances.    Ear/Nose/Mouth/Throat:       Decreased hearing: Bilaterally.    Respiratory:  No shortness of breath, No cough.    Cardiovascular:  No chest pain, No palpitations.    Gastrointestinal:  Constipation, No nausea, No vomiting, No diarrhea, No heartburn, No abdominal pain.    Immunologic:  Chemotherapy.    Musculoskeletal:  Joint pain.    Neurologic:  Alert and oriented X4, No numbness, No tingling, No headache.    Psychiatric:  No anxiety, No depression.       Health Status   Allergies:    Allergic Reactions (Selected)  No Known Allergies,    Allergies (1) Active Reaction  No Known Allergies None Documented     Current medications:  (Selected)   Inpatient Medications  Future  Aloxi (for IVPB): 0.25 mg, 5 mL, 150 mL/hr, IV Piggyback, Once-chemo, Days 1  Benadryl 50mg/ml Inj: 25 mg, 0.5 mL, IV Piggyback, Once-chemo, Days 1  Heparin Flush 100 U/mL - 5 mL: 500 units, 5 mL, IV Push, Once-chemo, Days 1  Neulasta 6mg/0.6ml delivery kit: 6 mg, 0.6 mL, Subcutaneous, Once-chemo, Days  1  Pepcid (for IVPB): 20 mg, 50 mL, 150 mL/hr, IV Piggyback, Once-chemo, Days 1  Taxol (for IVPB): 282 mg, 47.94 mL, 182.65 mL/hr, IV Piggyback, Once-chemo, Days 1  carboplatin (for IVPB): 430 mg, 43 mL, 586 mL/hr, IV Piggyback, Once-chemo, Days 1  dexamethasone (for IVPB): 10 mg, 1 mL, 153 mL/hr, IV Piggyback, Once-chemo, Days 1  fosaprepitant (for IVPB): 150 mg, 1 EA, 500 mL/hr, IV Piggyback, Once-chemo, Days 1  Documented Medications  Documented  Zofran 8 mg oral tablet: See Instructions, 1 tab(s) Oral TID for 3 days after chemotherapy and q8hr, PRN: as needed for nausea/vomiting, 30, 4 Refill(s)  dexamethasone 4 mg oral tablet: See Instructions, 2 tab(s) Oral night before chemotherapy and tab 1 PO with breakfast and tab 1 with lunch for 3 days after chemotherapy., 24, 1 Refill(s)  loratadine 10 mg oral capsule: See Instructions, 1 cap(s) Oral Daily for 5 days after chemotherapy., 0 Refill(s)  naproxen sodium 220 mg oral capsule: See Instructions, 2 cap(s) Oral BID for 3 days after chemotherapy, 0 Refill(s)  promethazine 25 mg oral tablet: 25 mg, 1 tab(s), Oral, q4hr, PRN: as needed for nausea/vomiting, 30 tab(s), 0 Refill(s)   Problem list:    All Problems  Ductal carcinoma in situ (DCIS) of right breast / [id number] / Confirmed  Cancer, uterine / [id number] / Confirmed  Obesity / [id number] / Probable,    Active Problems (3)  Cancer, uterine   Ductal carcinoma in situ (DCIS) of right breast   Obesity         Histories   Past Medical History:    No active or resolved past medical history items have been selected or recorded.   Family History:    Primary malignant neoplasm of breast  Sister  Stroke  Father     Procedure history:    Lumpectomy With Needle Placement (Right) on 8/7/2020 at 69 Years.  Comments:  8/11/2020 14:41 WILBERT - Lisa Power LPN  auto-populated from documented surgical case  Catheter Insertion Mediport (.) on 8/7/2020 at 69 Years.  Comments:  8/11/2020 14:41 NORI - Lisa Power LPN  E.  auto-populated from documented surgical case  Biopsy ([id number]) on 6/10/2020 at 69 Years.  Mammogram ([id number]) on 2020 at 69 Years.  Meniscus ([id number]) on 2017 at 66 Years.  Colonoscopy ([id number]) in  at 49 Years.   section ([id number]) on 1976 at 25 Years.   section ([id number]) on 1970 at 20 Years.   Social History        Social & Psychosocial Habits    Alcohol  2020  Use: Never    Home/Environment  2020  Lives with: Spouse    Living situation: Home/Independent    Tobacco  2020  Use: Never (less than 100 in l    Patient Wants Consult For Cessation Counseling N/A    Abuse/Neglect  2020  SHX Any signs of abuse or neglect No    Spiritual/Cultural  2020  Jain Preference None      2020  Branch of  Never in   .        Physical Examination   Vital Signs   2020 8:37 CDT        Temperature Oral          36.8 DegC                             Temperature Oral (calculated)             98.24 DegF                             Peripheral Pulse Rate     71 bpm                             SpO2                      92 %                             Oxygen Therapy            Room air                             Systolic Blood Pressure   121 mmHg                             Diastolic Blood Pressure  76 mmHg     Measurements from flowsheet : Measurements   2020 8:37 CDT        Weight Dosing             55.100 kg                             Weight Measured           55.1 kg                             Weight Measured and Calculated in Lbs     121.47 lb                             Height/Length Dosing      162.00 cm                             Height/Length Measured    162 cm                             BSA Measured              1.57 m2                             Body Mass Index Measured  21 kg/m2     General:  Alert and oriented, No acute distress.    Eye:  Pupils are equal, round and reactive to light, Normal  conjunctiva.    HENT:  Normocephalic, Oral mucosa is moist.    Neck:  Supple, Non-tender, No lymphadenopathy.    Respiratory:  Lungs are clear to auscultation, Respirations are non-labored.    Cardiovascular:  Normal rate, Regular rhythm, No edema.    Gastrointestinal:  Soft, Non-tender, Non-distended, Normal bowel sounds, No organomegaly.    Genitourinary:  No costovertebral angle tenderness.    Musculoskeletal:  Normal range of motion, Normal strength, No deformity, Normal gait.    Integumentary:  Warm, Dry.    Neurologic:  Alert, Oriented, Normal sensory, Normal motor function, No focal deficits, Cranial Nerves II-XII are grossly intact.    Cognition and Speech:  Speech clear and coherent.    Psychiatric:  Appropriate mood & affect.    ECOG Performance Scale: 0 - Fully active; no performance restrictions.      Review / Management   Results review:  Last 10 Days Lab Results : Lab View   9/8/2020 8:25 CDT        WBC                       5.8 x10(3)/mcL                             RBC                       3.79 x10(6)/mcL  LOW                             Hgb                       11.6 gm/dL  LOW                             Hct                       34.9 %  LOW                             Platelet                  290 x10(3)/mcL                             MCV                       92.1 fL                             MCH                       30.6 pg                             MCHC                      33.2 gm/dL                             RDW                       13.5 %                             MPV                       9.8 fL                             Abs Neut                  4.63 x10(3)/mcL                             NEUT%                     80.3 %  NA                             NEUT#                     4.6 x10(3)/mcL                             LYMPH%                    14.9 %  NA                             LYMPH#                    0.9 x10(3)/mcL                             MONO%                      4.3 %  NA                             MONO#                     0.2 x10(3)/mcL                             BASO%                     0.3 %  NA                             BASO#                     0.0 x10(3)/mcL    9/1/2020 11:20 CDT       Sodium Lvl                141 mmol/L                             Potassium Lvl             4.0 mmol/L                             Chloride                  102 mmol/L                             CO2                       30 mmol/L                             Calcium Lvl               8.6 mg/dL                             Glucose Lvl               79 mg/dL  LOW                             BUN                       7.3 mg/dL  LOW                             Creatinine                0.66 mg/dL                             eGFR-AA                   >60  NA                             eGFR-NOA                  >60  NA                             Bili Total                0.5 mg/dL                             Bili Direct               0.1 mg/dL                             Bili Indirect             0.40 mg/dL                             AST                       15 unit/L                             ALT                       21 unit/L                             Alk Phos                  113 unit/L                             Total Protein             6.5 gm/dL                             Albumin Lvl               4.1 gm/dL                             Globulin                  2.4 gm/dL                             A/G Ratio                 1.7 ratio  .    Laboratory Results   Documentation reviewed:  Flowsheet, Reviewed home medications.       Impression and Plan   IMPRESSION:  Stage Ia (pT1a N0) clear-cell uterine carcinoma--6/4/2020  Ductal carcinoma in situ (DCIS) of right breast with positive margins, 6/9/20  Patient will need vascular consultation for her iliac artery aneurysm. That needs to be placed on hold--patient and family made aware of the aneurysm.    PLAN:  Carboplatin AUC  of 6 and Taxol 175mg/m2 q 3 weeks X 3 cycles followed by repeat staging, Consider a total of 6 cycles  Cycle 1 on 8/18/20  proceed with cycle 2 today  for cycle 3 will discontinue the steroids the night before  We will decrease dexamethasone to 6 mg with this cycle of chemotherapy  RTC on 9/29 for TD visit and cycle 3, CBC, BMP  Day 8 CBC, CMP, magnesium and phosphorus  no day 15 labs  For positive margins with DCIS, consider 2nd surgery with Dr. Rojo until after cycle 3  We will repeat CT scan chest abdomen pelvis after third cycle of chemotherapy  and check a Ca1 25       and discuss 3 more cycles vs observation  Consider HER-2/bennie amplification-on gyn tissue      Ethan Harper MD

## 2022-04-30 NOTE — PROGRESS NOTES
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      DCIS, right breast, 20    Current Treatment: Adjuvant Carboplatin and taxol (20)    Treatment History:  20: right breast biopsy   Pathology: DCIS ER 96%NC 87%  6/10/20: CT A/P- 5.4 x 3.9 enhancing focus within the endometrial canal, small scattered hepatic cysts, aneurysmal dilatation of the left distal common iliac artery  20: Total hysterectomy, bilateral salpingo-oophorectomy, hysterectomy lymphadenectomy, peritoneal washings, omental biopsy   Pathology: Poorly differentiated carcinoma with clear cell features, High-grade    Arises within an endometrial polyp, 5.1 cm    Omental biopsy negative    Lymph nodes negative    Peritoneal washings negative8/10/20: Completed vaginal brachytherapy   20:Genetic Testing: no clinical significant variant detected, no variants of unknown significance  2020: Lumpectomy right breast 1 o'clock position   Pathology: Ductal carcinoma in situ grade 2 with focal comedonecrosis, no invasive malignancy, involves the posterior margin and anterior superior medial margins,   ER 96%NC 87%    Clinical History:    This patient was noted to have postmenopausal bleeding. About the end of 2020. she had an ultrasound finding of an endometrial polyp and some thickening. In May 2020 she was recommended to have a hysteroscopy and D&C. The biopsy from the D&C showed a undifferentiated carcinoma with clear cell features and a question of sarcomatoid features FIGO grade 3.  She was then seen and evaluated by GYN oncology. Underwent  surgery on June 19.  Prior to that she was also noted to have a right breast lesion. She was seen by Dr. Richardson Rojo. He was awaiting her genetic counseling report. And treatment of her breast cancer before making another decision regarding her DCIS. Biopsy was consistent with DCIS. She was also sent for genetic counseling. Final reports are pending as of July 30.      Interval History   Patient presents for C3 D1 of adjuvant carboplatin and Taxol.  She is doing very well. She did have some constipation, but managed with stool softeners and MOM. No N/V, no fever, SOB, no neuropathy, no abdominal pain. She continued to walk her 2-3 miles, drinks alot of water. Her labs are adequate     Laboratory review throughout the cycle shows mild anemia at 11.6, her platelets are stable.  She did not get neutropenic.  She received G-CSF.  He is here today for cycle 3 of treatment      Review of Systems   Constitutional:  Fatigue, No fever, No chills.    Eye:  No visual disturbances.    Ear/Nose/Mouth/Throat:       Decreased hearing: Bilaterally.    Respiratory:  No shortness of breath, No cough.    Cardiovascular:  No chest pain, No palpitations.    Gastrointestinal:  Constipation, No nausea, No vomiting, No diarrhea, No heartburn, No abdominal pain.    Immunologic:  Chemotherapy.    Musculoskeletal:  Joint pain.    Neurologic:  Alert and oriented X4, No numbness, No tingling, No headache.    Psychiatric:  No anxiety, No depression.       Health Status   Allergies:    Allergic Reactions (Selected)  No Known Allergies,    Allergies (1) Active Reaction  No Known Allergies None Documented     Current medications:  (Selected)   Documented Medications  Documented  Enoxaparin 40mg Inj: 40 mg, 40 mg, Subcutaneous, Daily  Zofran 8 mg oral tablet: See Instructions, 1 tab(s) Oral TID for 3 days after chemotherapy and q8hr, PRN: as needed for nausea/vomiting, 30, 4 Refill(s)  acetaminophen-hydrocodone 325 mg-5 mg oral tablet: 1 tab(s),  Oral, q4hr  dexamethasone 4 mg oral tablet: See Instructions, 2 tab(s) Oral night before chemotherapy and tab 1 PO with breakfast and tab 1 with lunch for 3 days after chemotherapy., 24, 1 Refill(s)  ibuprofen 800 mg oral tablet: 800 mg, 1 tab(s), Oral, q8hr  loratadine 10 mg oral capsule: See Instructions, 1 cap(s) Oral Daily for 5 days after chemotherapy., 0 Refill(s)  naproxen sodium 220 mg oral capsule: See Instructions, 2 cap(s) Oral BID for 3 days after chemotherapy, 0 Refill(s)  oxycodone 5 mg oral tablet: 5 mg, 1 tab(s), Oral, q4-6hr  promethazine 25 mg oral tablet: 25 mg, 1 tab(s), Oral, q4hr, PRN: as needed for nausea/vomiting, 30 tab(s), 0 Refill(s)   Problem list:    All Problems  Ductal carcinoma in situ (DCIS) of right breast / [id number] / Confirmed  Cancer, uterine / [id number] / Confirmed  Obesity / [id number] / Probable,    Active Problems (3)  Cancer, uterine   Ductal carcinoma in situ (DCIS) of right breast   Obesity         Histories   Past Medical History:    No active or resolved past medical history items have been selected or recorded.   Family History:    Primary malignant neoplasm of breast  Sister  Stroke  Father     Procedure history:    Lumpectomy With Needle Placement (Right) on 2020 at 69 Years.  Comments:  2020 14:41 Lisa Burch LPN  auto-populated from documented surgical case  Catheter Insertion Mediport (.) on 2020 at 69 Years.  Comments:  2020 14:41 Lisa Burch LPN  auto-populated from documented surgical case  Biopsy ([id number]) on 6/10/2020 at 69 Years.  Mammogram ([id number]) on 2020 at 69 Years.  Meniscus ([id number]) on 2017 at 66 Years.  Colonoscopy ([id number]) in  at 49 Years.   section ([id number]) on 1976 at 25 Years.   section ([id number]) on 1970 at 20 Years.   Social History        Social & Psychosocial Habits    Alcohol  2020  Use: Never    Home/Environment  2020  Lives with: Spouse     Living situation: Home/Independent    Tobacco  09/08/2020  Use: Never (less than 100 in l    Patient Wants Consult For Cessation Counseling N/A    Abuse/Neglect  09/08/2020  SHX Any signs of abuse or neglect No    Spiritual/Cultural  08/18/2020  Yazidism Preference None      08/18/2020  Branch of  Never in   .        Physical Examination   Vital Signs   9/29/2020 8:36 CDT       Temperature Oral          36.9 DegC                             Temperature Oral (calculated)             98.42 DegF                             Peripheral Pulse Rate     62 bpm                             SpO2                      98 %                             Oxygen Therapy            Room air                             Systolic Blood Pressure   119 mmHg                             Diastolic Blood Pressure  76 mmHg                             Blood Pressure Location   Right arm                             Manual Cuff BP            No     General:  Alert and oriented, No acute distress.    Eye:  Pupils are equal, round and reactive to light, Normal conjunctiva.    HENT:  Normocephalic, Oral mucosa is moist.    Neck:  Supple, Non-tender, No lymphadenopathy.    Respiratory:  Lungs are clear to auscultation, Respirations are non-labored.    Cardiovascular:  Normal rate, Regular rhythm, No edema.    Gastrointestinal:  Soft, Non-tender, Non-distended, Normal bowel sounds, No organomegaly.    Genitourinary:  No costovertebral angle tenderness.    Musculoskeletal:  Normal range of motion, Normal strength, No deformity, Normal gait.    Integumentary:  Warm, Dry.    Neurologic:  Alert, Oriented, Normal sensory, Normal motor function, No focal deficits, Cranial Nerves II-XII are grossly intact.    Cognition and Speech:  Speech clear and coherent.    Psychiatric:  Appropriate mood & affect.    ECOG Performance Scale: 0 - Fully active; no performance restrictions.      Review / Management   Results review   Laboratory  Results   Today's Lab Results : PowerNote Discrete Results   9/29/2020 8:45 CDT       POC Hb                    11.9 mg/dL  LOW                             POC Hct                   35.0 %  LOW                             POC Sodium                140 mmol/L                             POC Potassium             4.3 mmol/L                             POC Chloride              102 mmol/L                             POC Ion Calcium           1.25 mmol/L                             POC Glucose               103 mg/dL                             POC BUN                   8.0 mg/dL                             POC Creatinine            0.6 mg/dL                             POC AGAP                  17.0  NA    9/29/2020 8:31 CDT       WBC                       5.7 x10(3)/mcL                             RBC                       3.57 x10(6)/mcL  LOW                             Hgb                       11.3 gm/dL  LOW                             Hct                       33.4 %  LOW                             Platelet                  195 x10(3)/mcL                             MCV                       93.6 fL                             MCH                       31.7 pg  HI                             MCHC                      33.8 gm/dL                             RDW                       14.3 %                             MPV                       9.5 fL                             Abs Neut                  3.55 x10(3)/mcL                             NEUT%                     62.4 %  NA                             NEUT#                     3.6 x10(3)/mcL                             LYMPH%                    25.0 %  NA                             LYMPH#                    1.4 x10(3)/mcL                             MONO%                     9.5 %  NA                             MONO#                     0.5 x10(3)/mcL                             EOS%                      1.8 %  NA                             EOS#                       0.1 x10(3)/mcL                             BASO%                     1.1 %  NA                             BASO#                     0.1 x10(3)/mcL        Documentation reviewed:  Flowsheet, Reviewed home medications.       Impression and Plan   IMPRESSION:  Stage Ia (pT1a N0) clear-cell uterine carcinoma--6/4/2020  Ductal carcinoma in situ (DCIS) of right breast with positive margins, 6/9/20  Patient will need vascular consultation for her iliac artery aneurysm. That needs to be placed on hold--patient and family made aware of the aneurysm.    PLAN:  Carboplatin AUC of 6 and Taxol 175mg/m2 q 3 weeks X 3 cycles followed by repeat staging, Consider a total of 6 cycles  Cycle 1 on 8/18/20  Proceed with cycle 3 today  RTC in 3 weeks with Dr. Harper for scan results  For positive margins with DCIS, consider 2nd surgery with Dr. Rojo until after cycle 3. Scheduled for 10/7/20  We will repeat CT scan chest abdomen pelvis after third cycle of chemotherapy  and check a Ca1 25       and discuss 3 more cycles vs observation  Consider HER-2/bennie amplification-on gyn tissue

## 2022-04-30 NOTE — PROGRESS NOTES
Patient:   Kim Bah            MRN: [id number]            FIN: [id number]               Age:   69 years     Sex:  Female     :  1950   Associated Diagnoses:   None   Author:   Yaquelin Barrera      REFERRING PHYSICIAN: Dr. Richardson Rojo      Visit Information   Visit type:  Genetic Counseling.       Chief Complaint   Personal history of recently diagnosed breast cancer (right DCIS) and uterine cancer (undifferentiated carcinoma with clear cell and sarcomatoid features) and a family history of cancer. Dr. Rojo has requested genetic testing to assist with surgical decision-making. Patient presented via Telemedicine Visit today for risk assessment, genetic counseling, and consideration for genetic testing.      Health Status   Allergies:    No active allergies have been recorded.,    No qualifying data available     Current medications:  (Selected)      Problem list:    No problem items selected or recorded.,    No qualifying data available        Histories   Past Medical History:    No active or resolved past medical history items have been selected or recorded.   Family History:   Breast Cancer: proband dx70y (DCIS), sister dx27y, sister dx58y, maternal aunt dx60y  Pancreatic Cancer: maternal aunt dxUKage  NOTE: maternal and paternal extended family history not well known  (See pedigree for clarification.)     Procedure history:    No active procedure history items have been selected or recorded.   Social History        Social & Psychosocial Habits    No Data Available  .        Physical Examination   VS/Measurements      Impression and Plan   Risk Assessment:  This patient is at increased risk of having an inherited genetic mutation that increases her risk for cancer based on the National Comprehensive Cancer Network (NCCN) criteria due to a personal history of breast cancer and a family history breast cancer in two (2) first degree relatives with one diagnosed <50y (sister dx27y, sister  dx58y), with additional evidence with second degree relative on the same side of the family diagnosed with pancreatic cancer (maternal aunt) (see family history and pedigree above). Based on her likelihood of having a mutation, [facility] BRCA1/2 Analysis with CancerNext panel testing was described in detail.    Education and Counseling:  [facility] CancerNext evaluates a broad number of hereditary cancer syndromes to help define patients' cancer risk. This cancer panel tests 34 genes with known association to increased cancer risk: APC, SHIELA, BARD1, BRCA1, BRCA2, BMPR1A, BRIP1, CDH1, CDK4, CDKN2A, CHEK2, DICER1, HOXB13, EPCAM, GREM1, MLH1, MRE11A, MSH2, MSH6, MUTYH, NBN, NF1, PALB2, PMS2, POLD1, POLE, PTEN, RAD50, RAD51C, RAD51D, SMAD4, SMARCA4, STK11, TP53.    Risks of cancer associated with inherited cancer predisposition mutations were discussed in detail.  If a mutation were found, this patient would have a significantly increased risk for cancer.  Inherited cancer syndromes included in this test, may have different, but still significant risk for cancer.  Risk of cancer with any particular gene mutation will be discussed at the time of results disclosure and based on the results.    The availability of clinical management options for inherited cancer predisposition mutation carriers was discussed, including increased surveillance, chemoprevention, and prophylactic surgery. Details of the testing process, including benefits and limitations of genetic analysis as well as the implications of possible test results, were discussed.  Because this patient is the first member of her family to be tested comprehensive panel testing was presented.  Related insurance issues were discussed.      Summary:  This patient was evaluated for hereditary risk of cancer, and was found to be at an increased risk of having a inherited cancer predisposition gene mutation.  The option of genetic testing was explained in detail,  including the possible impact of this information on family members.  Since this patient wishes to proceed with testing an informed consent will be obtained and blood drawn and sent to [facility]. Expedited results will be requested.  Results will be expected ~2 weeks from this time.  A follow-up appointment will be scheduled for results disclosure.    This a Telemedicine note. Telemedicine appointment was performed according to [facility] protocols. I, distant provider, conducted the visit from the location below. I am licensed in the state where the patient stated they are located. The patient stated that they understood and accepted the privacy and security risks to their information at their location.    Patient was located their home.    I, distant provider, was located at [facility], [address] [place] [address] [address], [place], [place].       JUAN JAMES, PhD      Professional Services   TIME IN: 2:00 PM  TIME OUT: 2:45 PM

## 2022-04-30 NOTE — PROGRESS NOTES
Patient:   Kim Bah            MRN: [id number]            FIN: [id number]               Age:   70 years     Sex:  Female     :  1950   Associated Diagnoses:   None   Author:   Ethan Harper MD      Chief Complaint   2020 9:12 CST      No concerns today.      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      DCIS, right breast, 20    Current Treatment: Adjuvant Carboplatin and Taxol (20)    Treatment History:  20: right breast biopsy   Pathology: DCIS ER 96%CT 87%  6/10/20: CT A/P- 5.4 x 3.9 enhancing focus within the endometrial canal, small scattered hepatic cysts, aneurysmal dilatation of the left distal common iliac artery  20: Total hysterectomy, bilateral salpingo-oophorectomy, hysterectomy lymphadenectomy, peritoneal washings, omental biopsy   Pathology: Poorly differentiated carcinoma with clear cell features, High-grade    Arises within an endometrial polyp, 5.1 cm    Omental biopsy negative    Lymph nodes negative    Peritoneal washings negative8/10/20: Completed vaginal brachytherapy   20:Genetic Testing: no clinical significant variant detected, no variants of unknown significance  2020: Lumpectomy right breast 1 o'clock position   Pathology: Ductal carcinoma in situ grade 2 with focal comedonecrosis, no invasive malignancy, involves the posterior margin and anterior superior medial margins,   ER 96%CT 87%  20- CYCLE 1 Carboplatin AUC of 6 and Taxol 175mg/m2 q 3 weeks X 3 cycles followed by repeat staging, Consider a total of 6 cycles  10/16/2020: CT scan: 3 right pulmonary nodules measuring up to 6 mm indeterminate short-term follow-up as needed.  Numerous hypodense hepatic cysts continued follow-up.      Clinical History:    This patient was noted to have postmenopausal bleeding. About the end of 2020. she had an ultrasound finding of an endometrial polyp and some  thickening. In May 2020 she was recommended to have a hysteroscopy and D&C. The biopsy from the D&C showed a undifferentiated carcinoma with clear cell features and a question of sarcomatoid features FIGO grade 3.  She was then seen and evaluated by GYN oncology. Underwent surgery on June 19.  Prior to that she was also noted to have a right breast lesion. She was seen by Dr. Richardson Rojo. He was awaiting her genetic counseling report. And treatment of her breast cancer before making another decision regarding her DCIS. Biopsy was consistent with DCIS. She was also sent for genetic counseling. Final reports are pending as of July 30.      Interval History   Patient s/p  C4 D1 of adjuvant carboplatin and Taxol.  She is doing very well. She did have some constipation, but managed with stool softeners and MOM. No N/V, no fever, SOB, no neuropathy, no abdominal pain. She continued to walk her 2-3 miles, drinks alot of water. Her labs are adequate           Review of Systems   Constitutional:  Fatigue, No fever, No chills, No sweats, No weakness.    Eye:  No visual disturbances.    Ear/Nose/Mouth/Throat:       Decreased hearing: Bilaterally.    Respiratory:  No shortness of breath, No cough, No sputum production, No hemoptysis, No wheezing.    Cardiovascular:  No chest pain, No palpitations, No peripheral edema, No syncope.    Gastrointestinal:  Constipation, No nausea, No vomiting, No diarrhea, No heartburn, No abdominal pain.    Genitourinary:  No dysuria, No hematuria.    Hematology/Lymphatics:  No bruising tendency.    Immunologic:  Chemotherapy.    Musculoskeletal:  Joint pain.    Integumentary:  No rash.    Neurologic:  Alert and oriented X4, Numbness, Tingling, No abnormal balance, No confusion, No headache.    Psychiatric:  No anxiety, No depression.       Health Status   Allergies:    Allergic Reactions (Selected)  No Known Allergies,    Allergies (1) Active Reaction  No Known Allergies None Documented      Current medications:  (Selected)   Documented Medications  Documented  Stool Softener with Laxative: Oral, Once a day (at bedtime), 100 mg once a day, 0 Refill(s)  Zofran 8 mg oral tablet: See Instructions, PRN PRN as needed for nausea/vomiting, 1 tab(s) Oral TID for 3 days after chemotherapy and q8hr, # 30, 4 Refill(s)  dexamethasone 4 mg oral tablet: See Instructions, 2 tab(s) Oral night before chemotherapy and tab 1 PO with breakfast and tab 1 with lunch for 3 days after chemotherapy., # 24, 1 Refill(s)  loratadine 10 mg oral capsule: See Instructions, 1 cap(s) Oral Daily for 5 days after chemotherapy., 0 Refill(s)  naproxen sodium 220 mg oral capsule: See Instructions, 2 cap(s) Oral BID for 3 days after chemotherapy, 0 Refill(s)  promethazine 25 mg oral tablet: 25 mg = 1 tab(s), Oral, q4hr, PRN PRN as needed for nausea/vomiting, # 30 tab(s), 0 Refill(s)   Problem list:    All Problems  Aneurysm of iliac artery / [id number] / Confirmed  Port-A-Cath in place / [id number] / Confirmed  Ductal carcinoma in situ (DCIS) of right breast / [id number] / Confirmed  Cancer, uterine / [id number] / Confirmed  Pulmonary nodules/lesions, multiple / [id number] / Confirmed  Chemotherapy management, encounter for / [id number] / Confirmed  Canceled: Obesity / [id number],    Active Problems (6)  Aneurysm of iliac artery   Cancer, uterine   Chemotherapy management, encounter for   Ductal carcinoma in situ (DCIS) of right breast   Port-A-Cath in place   Pulmonary nodules/lesions, multiple         Histories   Past Medical History:    No active or resolved past medical history items have been selected or recorded.   Family History:    Primary malignant neoplasm of breast  Sister  Stroke  Father     Procedure history:    Lumpectomy With Needle Placement (Right) on 8/7/2020 at 69 Years.  Comments:  8/11/2020 14:41 WILBERT - Lisa Power LPN  auto-populated from documented surgical case  Catheter Insertion Mediport (.) on 8/7/2020 at 69  Years.  Comments:  2020 14:41 NORI - Lisa Power LPN  auto-populated from documented surgical case  Biopsy ([id number]) on 6/10/2020 at 69 Years.  Mammogram ([id number]) on 2020 at 69 Years.  Meniscus ([id number]) on 2017 at 66 Years.  Colonoscopy ([id number]) in  at 49 Years.   section ([id number]) on 1976 at 25 Years.   section ([id number]) on 1970 at 20 Years.   Social History        Social & Psychosocial Habits    Alcohol  2020  Use: Never    Home/Environment  2020  Lives with: Spouse    Living situation: Home/Independent    Tobacco  10/20/2020  Use: Former smoker, quit more    Patient Wants Consult For Cessation Counseling N/A    Type: Cigarettes    Started at age: 17 Years    Stopped at age: 18 Years    Abuse/Neglect  10/20/2020  SHX Any signs of abuse or neglect No    Spiritual/Cultural  2020  Restoration Preference None      2020  Branch of  Never in   .        Physical Examination   Vital Signs   2020 9:12 CST      Temperature Oral          36.7 DegC                             Temperature Oral (calculated)             98.06 DegF                             Peripheral Pulse Rate     61 bpm                             SpO2                      99 %                             Oxygen Therapy            Room air                             Systolic Blood Pressure   119 mmHg                             Diastolic Blood Pressure  77 mmHg                             Blood Pressure Location   Left arm                             Manual Cuff BP            No     Measurements from flowsheet : Measurements   2020 9:12 CST      Weight Dosing             54.300 kg                             Weight Measured           54.3 kg                             Weight Measured and Calculated in Lbs     119.71 lb                             Height/Length Dosing      162.00 cm                             Height/Length Measured    162  cm                             BSA Measured              1.56 m2                             Body Mass Index Measured  20.69 kg/m2     General:  Alert and oriented, No acute distress.    Eye:  Pupils are equal, round and reactive to light, Normal conjunctiva.    HENT:  Normocephalic, Oral mucosa is moist.    Neck:  Supple, Non-tender, No lymphadenopathy.    Respiratory:  Lungs are clear to auscultation, Respirations are non-labored.    Cardiovascular:  Normal rate, Regular rhythm, No edema.    Gastrointestinal:  Soft, Non-tender, Non-distended, Normal bowel sounds, No organomegaly.    Genitourinary:  No costovertebral angle tenderness.    Musculoskeletal:  Normal range of motion, Normal strength, No deformity, Normal gait.    Integumentary:  Warm, Dry.    Neurologic:  Alert, Oriented, Normal sensory, Normal motor function, No focal deficits, Cranial Nerves II-XII are grossly intact.    Cognition and Speech:  Speech clear and coherent.    Psychiatric:  Appropriate mood & affect.    ECOG Performance Scale: 0 - Fully active; no performance restrictions.      Review / Management   Results review:  Lab results   11/11/2020 9:16 CST      Est Creat Clearance Ser   73.57 mL/min    11/11/2020 8:53 CST      POC TCO2                  28.0 mmol/L                             POC Hb                    11.2 mg/dL  LOW                             POC Hct                   33.0 %  LOW                             POC Sodium                139 mmol/L                             POC Potassium             3.4 mmol/L  LOW                             POC Chloride              98 mmol/L                             POC Ion Calcium           1.27 mmol/L                             POC Glucose               139 mg/dL  HI                             POC BUN                   8.0 mg/dL                             POC Creatinine            0.6 mg/dL                             POC AGAP                  17.0  NA    11/11/2020 8:42 CST      WBC                        4.8 x10(3)/mcL                             RBC                       3.25 x10(6)/mcL  LOW                             Hgb                       10.7 gm/dL  LOW                             Hct                       31.6 %  LOW                             Platelet                  193 x10(3)/mcL                             MCV                       97.2 fL  HI                             MCH                       32.9 pg  HI                             MCHC                      33.9 gm/dL                             RDW                       14.2 %                             MPV                       10.1 fL                             Abs Neut                  3.02 x10(3)/mcL                             NEUT%                     62.8 %  NA                             NEUT#                     3.0 x10(3)/mcL                             LYMPH%                    24.7 %  NA                             LYMPH#                    1.2 x10(3)/mcL                             MONO%                     9.8 %  NA                             MONO#                     0.5 x10(3)/mcL                             EOS%                      1.7 %  NA                             EOS#                      0.1 x10(3)/mcL                             BASO%                     1.0 %  NA                             BASO#                     0.0 x10(3)/mcL  .    Laboratory Results   Documentation reviewed:  Flowsheet, Reviewed home medications.       Impression and Plan   IMPRESSION:  Stage Ia (pT1a N0) clear-cell uterine carcinoma--6/4/2020  Ductal carcinoma in situ (DCIS) of right breast with positive margins, 6/9/20  Patient will need vascular consultation for her iliac artery aneurysm. That needs to be placed on hold--patient and family made aware of the aneurysm.  Port-A-Cath in place  Pulmonary nodules--discussed in detail  Hepatic cyst  grade 1 neuropathy      PLAN:  Proceed with cycle 5 today  For positive margins  with DCIS, consider 2nd surgery with Dr. Rojo until after cycle 6.   We will repeat CT scan chest abdomen pelvis after 6th  cycle of chemotherapy  and check a Ca 125      Consider HER-2/bennie amplification-on gyn tissue  RTC In 3 weeks for TD and labs CBC, CMP,  cycle 6  DC oral Steroid Dexamethasone   if more neuropathy dose reduction 20% of both drugs        Ethan Harper MD       Professional Services   I, Britt Humphrey LPN, acted solely as a scribe for and in the presence of Dr. Ethan Harper who performed this service.

## 2022-04-30 NOTE — PROGRESS NOTES
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      DCIS, right breast, 20    Current Treatment: Adjuvant Carboplatin and Taxol (20)    Treatment History:  20: right breast biopsy   Pathology: DCIS ER 96%OH 87%  6/10/20: CT A/P- 5.4 x 3.9 enhancing focus within the endometrial canal, small scattered hepatic cysts, aneurysmal dilatation of the left distal common iliac artery  20: Total hysterectomy, bilateral salpingo-oophorectomy, hysterectomy lymphadenectomy, peritoneal washings, omental biopsy   Pathology: Poorly differentiated carcinoma with clear cell features, High-grade    Arises within an endometrial polyp, 5.1 cm    Omental biopsy negative    Lymph nodes negative    Peritoneal washings negative8/10/20: Completed vaginal brachytherapy   20:Genetic Testing: no clinical significant variant detected, no variants of unknown significance  2020: Lumpectomy right breast 1 o'clock position   Pathology: Ductal carcinoma in situ grade 2 with focal comedonecrosis, no invasive malignancy, involves the posterior margin and anterior superior medial margins,   ER 96%OH 87%  20- CYCLE 1 Carboplatin AUC of 6 and Taxol 175mg/m2 q 3 weeks X 3 cycles followed by repeat staging, Consider a total of 6 cycles  10/16/2020: CT scan: 3 right pulmonary nodules measuring up to 6 mm indeterminate short-term follow-up as needed.  Numerous hypodense hepatic cysts continued follow-up.      Clinical History:    This patient was noted to have postmenopausal bleeding. About the end of 2020. she had an ultrasound finding of an endometrial polyp and some  thickening. In May 2020 she was recommended to have a hysteroscopy and D&C. The biopsy from the D&C showed a undifferentiated carcinoma with clear cell features and a question of sarcomatoid features FIGO grade 3.  She was then seen and evaluated by GYN oncology. Underwent surgery on June 19.  Prior to that she was also noted to have a right breast lesion. She was seen by Dr. Richardson Rojo. He was awaiting her genetic counseling report. And treatment of her breast cancer before making another decision regarding her DCIS. Biopsy was consistent with DCIS. She was also sent for genetic counseling. Final reports are pending as of July 30.      Interval History   Patient s/p  C3 D1 of adjuvant carboplatin and Taxol.  She is doing very well. She did have some constipation, but managed with stool softeners and MOM. No N/V, no fever, SOB, no neuropathy, no abdominal pain. She continued to walk her 2-3 miles, drinks alot of water. Her labs are adequate     Laboratory review throughout the cycles shows mild anemia at 11.6, her platelets are stable.  She did not get neutropenic.  She received G-CSF.  He is here today for f/u of CT scan      Review of Systems   Constitutional:  Fatigue, No fever, No chills.    Eye:  No visual disturbances.    Ear/Nose/Mouth/Throat:       Decreased hearing: Bilaterally.    Respiratory:  No shortness of breath, No cough.    Cardiovascular:  No chest pain, No palpitations.    Gastrointestinal:  Constipation, No nausea, No vomiting, No diarrhea, No heartburn, No abdominal pain.    Immunologic:  Chemotherapy.    Musculoskeletal:  Joint pain.    Neurologic:  Alert and oriented X4, No numbness, No tingling, No headache.    Psychiatric:  No anxiety, No depression.       Health Status   Allergies:    Allergic Reactions (Selected)  No Known Allergies,    Allergies (1) Active Reaction  No Known Allergies None Documented     Current medications:  (Selected)   Documented Medications  Documented  Zofran 8 mg  oral tablet: See Instructions, PRN PRN as needed for nausea/vomiting, 1 tab(s) Oral TID for 3 days after chemotherapy and q8hr, # 30, 4 Refill(s)  dexamethasone 4 mg oral tablet: See Instructions, 2 tab(s) Oral night before chemotherapy and tab 1 PO with breakfast and tab 1 with lunch for 3 days after chemotherapy., # 24, 1 Refill(s)  loratadine 10 mg oral capsule: See Instructions, 1 cap(s) Oral Daily for 5 days after chemotherapy., 0 Refill(s)  naproxen sodium 220 mg oral capsule: See Instructions, 2 cap(s) Oral BID for 3 days after chemotherapy, 0 Refill(s)  promethazine 25 mg oral tablet: 25 mg = 1 tab(s), Oral, q4hr, PRN PRN as needed for nausea/vomiting, # 30 tab(s), 0 Refill(s)   Problem list:    All Problems  Aneurysm of iliac artery / [id number] / Confirmed  Port-A-Cath in place / [id number] / Confirmed  Ductal carcinoma in situ (DCIS) of right breast / [id number] / Confirmed  Cancer, uterine / [id number] / Confirmed  Obesity / 3119423602 / Probable  Chemotherapy management, encounter for / [id number] / Confirmed,    Active Problems (6)  Aneurysm of iliac artery   Cancer, uterine   Chemotherapy management, encounter for   Ductal carcinoma in situ (DCIS) of right breast   Obesity   Port-A-Cath in place         Histories   Past Medical History:    No active or resolved past medical history items have been selected or recorded.   Family History:    Primary malignant neoplasm of breast  Sister  Stroke  Father     Procedure history:    Lumpectomy With Needle Placement (Right) on 8/7/2020 at 69 Years.  Comments:  8/11/2020 14:41 Lisa Burch LPN  auto-populated from documented surgical case  Catheter Insertion Mediport (.) on 8/7/2020 at 69 Years.  Comments:  8/11/2020 14:41 Lisa Burch LPN  auto-populated from documented surgical case  Biopsy ([id number]) on 6/10/2020 at 69 Years.  Mammogram ([id number]) on 6/1/2020 at 69 Years.  Meniscus ([id number]) on 1/1/2017 at 66 Years.  Colonoscopy  ([id number]) in  at 49 Years.   section ([id number]) on 1976 at 25 Years.   section ([id number]) on 1970 at 20 Years.   Social History        Social & Psychosocial Habits    Alcohol  2020  Use: Never    Home/Environment  2020  Lives with: Spouse    Living situation: Home/Independent    Tobacco  2020  Use: Former smoker, quit more    Patient Wants Consult For Cessation Counseling N/A    Type: Cigarettes    Started at age: 17 Years    Stopped at age: 18 Years    Abuse/Neglect  2020  SHX Any signs of abuse or neglect No    Spiritual/Cultural  2020  Yazdanism Preference None      2020  Branch of  Never in   .        Physical Examination   Vital Signs   10/20/2020 9:17 CDT      Temperature Oral          36.7 DegC                             Temperature Oral (calculated)             98.06 DegF                             Peripheral Pulse Rate     59 bpm  LOW                             SpO2                      96 %                             Oxygen Therapy            Room air                             Systolic Blood Pressure   109 mmHg                             Diastolic Blood Pressure  76 mmHg                             Blood Pressure Location   Left arm                             Manual Cuff BP            No     Measurements from flowsheet : Measurements   10/20/2020 9:17 CDT      Weight Dosing             54.700 kg                             Weight Measured           54.7 kg                             Weight Measured and Calculated in Lbs     120.59 lb                             Height/Length Dosing      162.00 cm                             Height/Length Measured    162 cm                             BSA Measured              1.57 m2                             Body Mass Index Measured  20.84 kg/m2     General:  Alert and oriented, No acute distress.    Eye:  Pupils are equal, round and reactive to light, Normal  conjunctiva.    HENT:  Normocephalic, Oral mucosa is moist.    Neck:  Supple, Non-tender, No lymphadenopathy.    Respiratory:  Lungs are clear to auscultation, Respirations are non-labored.    Cardiovascular:  Normal rate, Regular rhythm, No edema.    Gastrointestinal:  Soft, Non-tender, Non-distended, Normal bowel sounds, No organomegaly.    Genitourinary:  No costovertebral angle tenderness.    Musculoskeletal:  Normal range of motion, Normal strength, No deformity, Normal gait.    Integumentary:  Warm, Dry.    Neurologic:  Alert, Oriented, Normal sensory, Normal motor function, No focal deficits, Cranial Nerves II-XII are grossly intact.    Cognition and Speech:  Speech clear and coherent.    Psychiatric:  Appropriate mood & affect.    ECOG Performance Scale: 0 - Fully active; no performance restrictions.      Review / Management   Results review:  Lab results   10/20/2020 8:57 CDT      WBC                       4.0 x10(3)/mcL  LOW                             RBC                       3.64 x10(6)/mcL  LOW                             Hgb                       11.6 gm/dL  LOW                             Hct                       34.8 %  LOW                             Platelet                  221 x10(3)/mcL                             MCV                       95.6 fL  HI                             MCH                       31.9 pg  HI                             MCHC                      33.3 gm/dL                             RDW                       14.6 %                             MPV                       10.1 fL                             Abs Neut                  2.32 x10(3)/mcL                             NEUT%                     58.1 %  NA                             NEUT#                     2.3 x10(3)/mcL                             LYMPH%                    28.8 %  NA                             LYMPH#                    1.2 x10(3)/mcL                             MONO%                     10.0 %   NA                             MONO#                     0.4 x10(3)/mcL                             EOS%                      1.8 %  NA                             EOS#                      0.1 x10(3)/mcL                             BASO%                     1.0 %  NA                             BASO#                     0.0 x10(3)/mcL  .    Laboratory Results   Documentation reviewed:  Flowsheet, Reviewed home medications.       Impression and Plan   IMPRESSION:  Stage Ia (pT1a N0) clear-cell uterine carcinoma--6/4/2020  Ductal carcinoma in situ (DCIS) of right breast with positive margins, 6/9/20  Patient will need vascular consultation for her iliac artery aneurysm. That needs to be placed on hold--patient and family made aware of the aneurysm.  Port-A-Cath in place  Pulmonary nodules--discussed in detail  Hepatic cyst    PLAN:  Proceed with cycle 4 today  For positive margins with DCIS, consider 2nd surgery with Dr. Rojo until after cycle 6.   We will repeat CT scan chest abdomen pelvis after 6th  cycle of chemotherapy  and check a Ca 125       and discuss 3 more cycles vs observation  Consider HER-2/bennie amplification-on gyn tissue  RTC In 3 weeks for TD and labs CBC, CMP, mag, phos,   DC oral Steroid Dexamethasone     Ethan Harper MD       Professional Services   I, Britt Humphrey LPN, acted solely as a scribe for and in the presence of Dr. Ethan Harper who performed this service.

## 2022-05-04 NOTE — HISTORICAL [facility]ER
This is a historical note converted from Jami. Formatting and pictures may have been removed.  Please reference Jami for original formatting and attached multimedia. Chief Complaint  No concerns today.  History of Present Illness  Referring physician: Benjamín Pearson MD  ?   Diagnosis:  1. 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 ?? ?? ?? ?? ?? ?Arises within an endometrial polyp  ? ?? ?? ?? ?? ?? ? 5.1 cm  ? ?? ?? ?? ?? ?? ? High-grade  ? ?? ?? ?? ?? ?? ?? Omental biopsy negative  ? ?? ?? ?? ?? ?? ?? Lymph nodes negative  ? ?? ?? ?? ?? ?? ?? ? Peritoneal washings negative  ?   Negative ER, negative AK,  Positive PAX 8 and CK7  Intact mismatch repair function.?  ?   Treatment history:  1. June 19, 2020: Total hysterectomy, bilateral salpingo-oophorectomy, hysterectomy lymphadenectomy, peritoneal washings, omental biopsy  ?  2.??vaginal brachii therapy?--completed on 8/10/20  ?  ?   2. Right breast DCIS: June 9, 2020:  Staging:  Pathology: Ductal carcinoma in situ  ER 96%  AK 87%  ?  ?   ?Treatment history: (DCIS)  ???????? 1. Biopsy--june 9,2020  ????????? 2. Genetic Testing:  ????????????? 8/4/2020,? no clinical significant variant detected,  ??????????????????????? no variants of unknown significance  3 .8/7/2020: Lumpectomy right breast 1 oclock position  ? ?? ?? ?? ?? ? Pathology: Ductal carcinoma in situ grade 2 with focal comedonecrosis, no invasive malignancy, involves the posterior margin and anterior superior medial margins,  ER 96%  AK 87%  ?  Imaging  [place] 10, 2020:: CT scan abdomen pelvis  ? ?? ?? ?? ? 5.4 x 3.9 enhancing focus within the endometrial canal, small scattered hepatic cysts, aneurysmal dilatation of the left? distal common iliac artery,  ?   Chest x-ray June 12, 2020, no evidence of cardiopulmonary disease  ?  ?  June 9, 2020: Right breast 1 oclock position identified by ultrasound.  mass  ?  ?  History: This patient was noted to have postmenopausal bleeding.? About the end of April 2020. she had an ultrasound finding of an endometrial polyp and some thickening.? In May 2020 she was recommended to have a hysteroscopy and D&C.? The biopsy from the D&C showed a undifferentiated carcinoma with clear cell features and a question of sarcomatoid features FIGO grade 3.  She was then seen and evaluated by GYN oncology.? Underwent surgery on June 19.  Prior to that she was also noted to have a right breast lesion.? She was seen by Dr. Richardson Rojo.? He was awaiting her?genetic counseling report. ?And treatment of her breast cancer before?making another decision regarding her DCIS. ?Biopsy was consistent with DCIS.? She was also sent for genetic counseling.? Final reports are pending as of?July 30.  ?  ?  Interim history: August 18, 2020.? Since her last visit patients genetic testing was negative. ?She underwent a lumpectomy on?August 7. ?Positive margins of DCIS. ?She was seen evaluated by Dr. Contreras.? And he wanted us to wait 5 to 7 days after recovery before starting her on chemotherapy.? The plan was for her to do her vaginal brachii therapy.? She completed  Review of Systems  see above  Physical Exam  Vitals & Measurements  T:?36.8? ?C (Oral)? HR:?62(Peripheral)? BP:?115/79?  HT:?162.00?cm? WT:?92.400?kg? BMI:?35.21?  VITAL SIGNS: ?Reviewed. ?[ ?]  GENERAL: [Well-developed well-nourished in no apparent cardiorespiratory distress] ?  HEAD: Normocephalic, atraumatic  EYES: ?Pupils are equal. ? reactive to light. ?Extraocular motions intact. ?No scleral icterus. ?No pallor.  EARS: ?Hearing grossly intact.  MOUTH: ?Oropharynx is clear without exudates or erythema.?  NECK: Supple no adenopathy, no JVD. ?Trachea midline  CHEST: ?Chest with clear breath sounds bilaterally. ?No wheezes, rales, or rhonchi. ?  CARDIAC: ?Regular rate and rhythm. ?S1 and S2, without murmurs, gallops, or rubs.  VASCULAR: ?No  Edema. ?Peripheral pulses normal and equal in all extremities.  ABDOMEN: ?Soft, . ?No sign of distention. ?No ? rebound or guarding, and no masses palpated. ? Bowel Sounds normal.  ???????????????????? Surgical scar?healed.  MUSCULOSKELETAL: ?Good range of motion of all major joints. Extremities without clubbing, cyanosis or edema. ?  NEUROLOGIC EXAM: ?Alert and oriented x 3. ?No focal sensory or strength deficits. ? Speech normal. ?Follows commands.  PSYCHIATRIC: ?Mood normal.  SKIN: ?No rash or lesions.  Breast: There is no nipple discharge, there is no inverted nipples, there is no skin redness or irritation. ?She has bilateral fibrocystic breast.? You can see the biopsy site.? There is a very minimal seroma type cavity felt.  There is no palpable adenopathy. ?It would be difficult to ascertain?a nodule with her fibrocystic disease.  Lymph nodes: no ?supraclavicular, infraclavicular, axillary, or inguinal adenopathy  ECOG=0  ?   new right port  Assessment/Plan  1.?Cancer, uterine?C54.1  Ordered:  pegfilgrastim, 6 mg, form: Kit, Subcutaneous, Once-chemo, first dose 08/18/20 9:00:00 CDT, stop date 08/18/20 9:00:00 CDT, Diagnosis: Drug Induced Neutropenia, Days 1, Future Order  ?  2.?Ductal carcinoma in situ (DCIS) of right breast?D05.11  ?  1.  Status?surgery and brachii therapy  2. Is post lumpectomy with positive margin  ?   ?surveillance and survivorship also discussed regarding repeat?imaging.? And the need for continued GYN follow-up for pelvic examination..)  ?  ?   PLAN:  DCIS=? ???2nd surgery PER dR. Carrillo  HER-2/bennie amplification- CONSIDER ON TISSUE GYN  ?   Chemotherapy education  Carboplatin AUC of 6, Taxol 175 every 3 weeks x3 cycles---?followed by?repeat staging, ? TOTAL OF 6 CYCLES-./TREATMENT OF?BREAST  and AI for DCIS x 5 years  ?  ?  ?patient will need vascular consultation for her iliac artery aneurysm.? That needs to be placed on hold--patient and family made aware of the aneurysm.  ?  F/U  Naima with day 8 cbc  I think it is reasonable to wait till after 3 cycle of chemo to re-eval breast  ?????????? surgery and or Xrt  ?  ?  ?  AFUA?LENNY?MD RUTH  ?   Problem List/Past Medical History  Ongoing  Cancer, uterine  Ductal carcinoma in situ (DCIS) of right breast  Obesity  Procedure/Surgical History  Catheter Insertion Mediport (.) (2020)  Lumpectomy With Needle Placement (Right) (2020)  Biopsy (06/10/2020)  Mammogram (2020)  Meniscus (2017)  Colonoscopy ()   section (1976)   section (1970)   Medications  dexamethasone 4 mg oral tablet, See Instructions  loratadine 10 mg oral capsule, See Instructions  naproxen sodium 220 mg oral capsule, See Instructions  promethazine 25 mg oral tablet, 25 mg= 1 tab(s), Oral, q4hr, PRN  Zofran 8 mg oral tablet, See Instructions, PRN  Allergies  No Known Allergies  Social History  Abuse/Neglect  No, 2020  Alcohol  Never, 2020  Home/Environment  Lives with Spouse. Living situation: Home/Independent., 2020  Tobacco  Never (less than 100 in lifetime), N/A, 2020  Family History  Primary malignant neoplasm of breast: Sister.  Stroke: Father.  Health Maintenance  Health Maintenance  ???Pending?(in the next year)  ??? ??OverDue  ??? ? ? ?Colorectal Screening due??09??and every 10??year(s)  ??? ? ? ?Alcohol Misuse Screening due??20??and every 1??year(s)  ??? ? ? ?Cognitive Screening due??20??and every 1??year(s)  ??? ??Due?  ??? ? ? ?ADL Screening due??20??and every 1??year(s)  ??? ? ? ?Aspirin Therapy for CVD Prevention due??20??and every 1??year(s)  ??? ? ? ?Bone Density Screening due??20??Variable frequency  ??? ? ? ?Influenza Vaccine due??20??and every?  ??? ? ? ?Lipid Screening due??20??and every?  ??? ? ? ?Medicare Annual Wellness Exam due??20??and every 1??year(s)  ??? ? ? ?Pneumococcal Vaccine due??20??and every?  ??? ? ? ?Tetanus  Vaccine due??08/18/20??and every 10??year(s)  ??? ? ? ?Zoster Vaccine due??08/18/20??and every?  ??? ??Due In Future?  ??? ? ? ?Obesity Screening not due until??01/01/21??and every 1??year(s)  ??? ? ? ?Advance Directive not due until??01/02/21??and every 1??year(s)  ??? ? ? ?Fall Risk Assessment not due until??01/02/21??and every 1??year(s)  ??? ? ? ?Functional Assessment not due until??01/02/21??and every 1??year(s)  ???Satisfied?(in the past 1 year)  ??? ??Satisfied?  ??? ? ? ?Advance Directive on??08/07/20.??Satisfied by Marcela Manriquez RN  ??? ? ? ?Blood Pressure Screening on??08/18/20.??Satisfied by Britt Humphrey  ??? ? ? ?Body Mass Index Check on??08/18/20.??Satisfied by Britt Humphrey  ??? ? ? ?Breast Cancer Screening on??07/31/20.??Satisfied by Nani Sanabria  ??? ? ? ?Cervical Cancer Screening on??05/06/20.??Satisfied by Tate PATEL CT(ASCP)Milly  ??? ? ? ?Depression Screening on??08/18/20.??Satisfied by Britt Humphrey  ??? ? ? ?Diabetes Screening on??07/31/20.??Satisfied by Bernice Clement  ??? ? ? ?Fall Risk Assessment on??08/18/20.??Satisfied by Britt Humphrey  ??? ? ? ?Functional Assessment on??08/07/20.??Satisfied by Marcela Manriquez RN  ??? ? ? ?Obesity Screening on??08/18/20.??Satisfied by Britt Humphrey  ?  Lab Results  Test Name Test Result Date/Time   POC Sodium 136 mmol/L (Low) 08/18/2020 09:10 CDT   POC Potassium 3.6 mmol/L 08/18/2020 09:10 CDT   POC Chloride 98 mmol/L 08/18/2020 09:10 CDT   POC Ion Calcium 1.21 mmol/L 08/18/2020 09:10 CDT   POC Glucose 147 mg/dL (High) 08/18/2020 09:10 CDT   POC BUN 7.0 mg/dL (Low) 08/18/2020 09:10 CDT   POC Creatinine 0.6 mg/dL 08/18/2020 09:10 CDT   WBC 5.0 x10(3)/mcL 08/18/2020 08:55 CDT   RBC 3.83 x10(6)/mcL (Low) 08/18/2020 08:55 CDT   Hgb 11.8 gm/dL (Low) 08/18/2020 08:55 CDT   Hct 35.9 % (Low) 08/18/2020 08:55 CDT   Platelet 244 x10(3)/mcL 08/18/2020 08:55 CDT   MCV 93.7 fL 08/18/2020 08:55 CDT   MCH 30.8 pg 08/18/2020 08:55 CDT   MCHC 32.9 gm/dL (Low)  08/18/2020 08:55 CDT   RDW 12.6 % 08/18/2020 08:55 CDT   MPV 10.0 fL 08/18/2020 08:55 CDT   Abs Neut 4.04 x10(3)/[facility] 08/18/2020 08:55 CDT   NEUT% 80.1 % 08/18/2020 08:55 CDT   NEUT# 4.0 x10(3)/[facility] 08/18/2020 08:55 CDT   LYMPH% 13.9 % 08/18/2020 08:55 CDT   LYMPH# 0.7 x10(3)/[facility] 08/18/2020 08:55 CDT   MONO% 4.2 % 08/18/2020 08:55 CDT   MONO# 0.2 x10(3)/[facility] 08/18/2020 08:55 CDT   EOS% 0.2 % 08/18/2020 08:55 CDT   EOS# 0.0 x10(3)/[facility] 08/18/2020 08:55 CDT   BASO% 0.8 % 08/18/2020 08:55 CDT   BASO# 0.0 x10(3)/[facility] 08/18/2020 08:55 CDT

## 2022-05-06 ENCOUNTER — HOSPITAL ENCOUNTER (OUTPATIENT)
Dept: RADIOLOGY | Facility: HOSPITAL | Age: 72
Discharge: HOME | End: 2022-05-06
Attending: INTERNAL MEDICINE
Payer: MEDICARE

## 2022-05-06 DIAGNOSIS — D05.11 DUCTAL CARCINOMA IN SITU (DCIS) OF RIGHT BREAST: ICD-10-CM

## 2022-05-06 DIAGNOSIS — R91.8 PULMONARY NODULES/LESIONS, MULTIPLE: ICD-10-CM

## 2022-05-06 DIAGNOSIS — C54.1 ENDOMETRIAL SARCOMA: ICD-10-CM

## 2022-05-06 PROBLEM — M81.0 OSTEOPOROSIS: Status: ACTIVE | Noted: 2022-05-06

## 2022-05-06 PROBLEM — I72.3 ANEURYSM ARTERY, ILIAC: Status: ACTIVE | Noted: 2021-05-31

## 2022-05-06 PROBLEM — Z90.11 STATUS POST RIGHT MASTECTOMY: Status: ACTIVE | Noted: 2021-05-31

## 2022-05-06 PROBLEM — E53.8 VITAMIN B12 DEFICIENCY: Status: ACTIVE | Noted: 2021-05-31

## 2022-05-06 PROCEDURE — 25500020 PHARM REV CODE 255: Performed by: INTERNAL MEDICINE

## 2022-05-06 PROCEDURE — 74178 CT ABD&PLV WO CNTR FLWD CNTR: CPT | Mod: TC

## 2022-05-06 RX ORDER — LETROZOLE 2.5 MG/1
TABLET, FILM COATED ORAL
COMMUNITY
Start: 2022-03-23 | End: 2022-06-06

## 2022-05-06 RX ORDER — CALCIUM CARBONATE 600 MG
600 TABLET ORAL ONCE
COMMUNITY

## 2022-05-06 RX ORDER — CYANOCOBALAMIN 1000 UG/ML
INJECTION, SOLUTION INTRAMUSCULAR; SUBCUTANEOUS
COMMUNITY
Start: 2022-03-23 | End: 2023-01-11

## 2022-05-06 RX ORDER — SYRINGE AND NEEDLE,INSULIN,1ML 25GX1"
SYRINGE, EMPTY DISPOSABLE MISCELLANEOUS
COMMUNITY
Start: 2022-04-12 | End: 2022-05-09

## 2022-05-06 RX ORDER — DENOSUMAB 60 MG/ML
60 INJECTION SUBCUTANEOUS
COMMUNITY

## 2022-05-06 RX ORDER — CHOLECALCIFEROL (VITAMIN D3) 25 MCG
2000 TABLET ORAL DAILY
COMMUNITY

## 2022-05-06 RX ADMIN — IOPAMIDOL 100 ML: 755 INJECTION, SOLUTION INTRAVENOUS at 12:05

## 2022-05-06 NOTE — PROGRESS NOTES
Subjective:       Patient ID: Kim Bah is a 71 y.o. female.    Chief Complaint:   Current Treatment:  Aromasin February 25, 2021 05/06/2022:  Ms. Kim bah is here today for follow-up.  She was last seen by us in November of 2021. Since that time she had cataract surgery on March 2nd.  And she was seen by gyn Oncology last in January.  She is scheduled for mammogram on July 27th.  And she was scheduled for repeat CT imaging on 05/06/2022.  The last assessment by gyn Oncology showed no evidence of active disease.  And I plan on seeing her in June or July of this year.      Oncology History   Ductal carcinoma in situ (DCIS) of right breast   6/9/2020 Initial Diagnosis    Treatment History:  6/9/20: right breast biopsy   Pathology: DCIS ER 96%ME 87%    8/4/20:Genetic Testing: no clinical significant variant detected, no variants of unknown significance  8/7/2020: Lumpectomy right breast 1 o'clock position   Pathology: Ductal carcinoma in situ grade 2 with focal comedonecrosis, no invasive malignancy, involves the posterior margin and anterior superior medial margins,   ER 96%ME 87%   2/1/21: Right simple mastectomy. DCIS, multifocal (X5) at the periphery of the previous lumpectomy site.  THE LARGEST FOCUS OF DCIS IS 6 MM.  SKIN / NIPPLE AND MARGINS CLEAR (> 1 CM.).  ONE SENTINEL LYMPH NODE, NEGATIVE FOR METASTATIC CARCINOMA.  AXILLARY CONTENTS:TWO LYMPH NODES, NEGATIVE FOR METASTATIC CARCINOMA.  ADDITIONAL SKIN:NEGATIVE FOR CARCINOMA.  pTis, pN0     2/25/2021 -  Hormone Therapy    Aromasin     Malignant neoplasm of endometrium   6/4/2020 Initial Diagnosis    Malignant neoplasm of endometrium     6/19/2020 Surgery    6/19/20: Total hysterectomy, bilateral salpingo-oophorectomy, hysterectomy lymphadenectomy, peritoneal washings, omental biopsy   Pathology: Poorly differentiated carcinoma with clear cell features, High-grade    Arises within an endometrial polyp, 5.1 cm    Omental biopsy negative    Lymph nodes  negative    Peritoneal washings negative8/10/20: Completed vaginal brachytherapy      8/4/2020 Genetic Testing    8/4/20:Genetic Testing: no clinical significant variant detected, no variants of unknown significance     8/18/2020 - 12/2/2020 Chemotherapy    8/18/20-12/2/20: Completed 6 cycles of Carboplatin AUC of 6 and Taxol 175mg/m2 q 3 weeks         Past Medical History:   Diagnosis Date    Aneurysm of iliac artery     Cancer, uterine     DCIS (ductal carcinoma in situ) right breast     Deficiency of vitamin B12     Osteoporosis of lumbar spine     Port-A-Cath in place     removed    Pulmonary nodules       Review of patient's allergies indicates:  No Known Allergies   Review of Systems    Review of systems: Constitutional:  [No fever, fatigue or weight loss.  No chills, no night sweats.  No weakness]    Eyes:  [No recent vision changes,]    ENT:  [No congestion, ear pain, or sore throat. no ringing in ears. No Dry mouth. no epistaxis .  No dental problems.  No epistaxis]    Endocrine:  [No thyroid problems. ]    Cardiovascular:  [No chest pain, Palpitations,  PND, orthopnea.  Edema]    Respiratory:  [No cough, shortness of breath, congestion, or wheezing.  No hemoptysis]    Gastrointestinal:  [No abdominal pain, nausea, vomiting, or diarrhea. No jaundice  NO Black tarry stool].  No heartburn.  No anorexia.    Genitourinary: [No dysuria.  No frequency.]    Musculoskeletal:  [No joint swelling.  No joint pain.]    Neurologic:  [No seizures.  No numbness, or tingling. No Headaches. No loss of balance. no new weakness.  No headaches.  No tremors]   Skin:  [No rash.  No new skin lesions. no excess dryness]    Hematologic:  [No unusual bruising or bleeding.]    Psychiatric:  [No psychiatric problems, hallucinations or depression.]    [All other systems reviewed and otherwise negative.]    Objective:      Physical Exam    Normal Physical exam:  VITAL SIGNS:  Reviewed.  [  ]  GENERAL: [Well-developed  well-nourished in no apparent cardiorespiratory distress]    HEAD: Normocephalic, atraumatic  EYES:  Pupils are equal.   reactive to light.  Extraocular motions intact.  No scleral icterus.  No pallor.  EARS:  Hearing grossly intact.  MOUTH:  Oropharynx is clear without exudates or erythema.   NECK: Supple no adenopathy, no JVD.  Trachea midline  CHEST:  Chest with clear breath sounds bilaterally.  No wheezes, rales, or rhonchi.                  No masses or nodularities palpated on chest wall or sternum ribs    CARDIAC:  Regular rate and rhythm.  S1 and S2, without murmurs, gallops, or rubs.  VASCULAR:  No Edema.  Peripheral pulses normal and equal in all extremities.  ABDOMEN:  Soft, without detectable tenderness.  No sign of distention.  No   rebound or guarding, and no masses palpated.   Bowel Sounds normal.  MUSCULOSKELETAL:  Good range of motion of all major joints. Extremities without clubbing, cyanosis or edema.    NEUROLOGIC EXAM:  Alert and oriented x 3.  No focal sensory or strength deficits.   Speech normal.  Follows commands.  PSYCHIATRIC:  Mood normal.      ECOG SCORE    0 - Fully active-able to carry on all pre-disease performance without restriction       Assessment:       Problem List Items Addressed This Visit        Oncology    Ductal carcinoma in situ (DCIS) of right breast      Other Visit Diagnoses     Malignant neoplasm of uterus, unspecified site    -  Primary    Pulmonary nodules/lesions, multiple        B12 deficiency                       Plan:       Continue Aromasin until February of 2026  Prolia due October 3, 2022--standing order for BMP every 6 months:  Continue until April 2024                   Bone density next due March of 2023  Return to clinic in 6 months with CBC, CMP, , vitamin-D level, B12 level  Continue B12 monthly  This patient is do an MRI of the chest wall and breast.  Since this will include her sternum and rib area, will wait until May of 2023 to order a new scan  unless otherwise clinically indicated    Med Onc Chart Routing      Follow up with physician 6 months.   Follow up with LELA    Labs CBC, CMP, vitamin D and vitamin B12   Lab interval:     Imaging    Pharmacy appointment    Other referrals

## 2022-05-09 ENCOUNTER — OFFICE VISIT (OUTPATIENT)
Dept: HEMATOLOGY/ONCOLOGY | Facility: CLINIC | Age: 72
End: 2022-05-09
Payer: MEDICARE

## 2022-05-09 VITALS
HEIGHT: 64 IN | OXYGEN SATURATION: 98 % | TEMPERATURE: 98 F | HEART RATE: 65 BPM | DIASTOLIC BLOOD PRESSURE: 70 MMHG | WEIGHT: 115.75 LBS | BODY MASS INDEX: 19.76 KG/M2 | SYSTOLIC BLOOD PRESSURE: 106 MMHG

## 2022-05-09 DIAGNOSIS — C55 MALIGNANT NEOPLASM OF UTERUS, UNSPECIFIED SITE: ICD-10-CM

## 2022-05-09 DIAGNOSIS — R91.8 PULMONARY NODULES/LESIONS, MULTIPLE: ICD-10-CM

## 2022-05-09 DIAGNOSIS — M81.0 AGE-RELATED OSTEOPOROSIS WITHOUT CURRENT PATHOLOGICAL FRACTURE: Primary | ICD-10-CM

## 2022-05-09 DIAGNOSIS — C54.1 MALIGNANT NEOPLASM OF ENDOMETRIUM: ICD-10-CM

## 2022-05-09 DIAGNOSIS — E53.8 B12 DEFICIENCY: ICD-10-CM

## 2022-05-09 DIAGNOSIS — D05.11 DUCTAL CARCINOMA IN SITU (DCIS) OF RIGHT BREAST: ICD-10-CM

## 2022-05-09 PROCEDURE — 99999 PR PBB SHADOW E&M-EST. PATIENT-LVL III: CPT | Mod: PBBFAC,,, | Performed by: INTERNAL MEDICINE

## 2022-05-09 PROCEDURE — 99214 OFFICE O/P EST MOD 30 MIN: CPT | Mod: S$PBB,,, | Performed by: INTERNAL MEDICINE

## 2022-05-09 PROCEDURE — 99213 OFFICE O/P EST LOW 20 MIN: CPT | Mod: PBBFAC | Performed by: INTERNAL MEDICINE

## 2022-05-09 PROCEDURE — 99999 PR PBB SHADOW E&M-EST. PATIENT-LVL III: ICD-10-PCS | Mod: PBBFAC,,, | Performed by: INTERNAL MEDICINE

## 2022-05-09 PROCEDURE — 99214 PR OFFICE/OUTPT VISIT, EST, LEVL IV, 30-39 MIN: ICD-10-PCS | Mod: S$PBB,,, | Performed by: INTERNAL MEDICINE

## 2022-05-11 ENCOUNTER — PATIENT MESSAGE (OUTPATIENT)
Dept: HEMATOLOGY/ONCOLOGY | Facility: CLINIC | Age: 72
End: 2022-05-11
Payer: MEDICARE

## 2022-05-14 NOTE — OP NOTE
Patient:   Kim Bah            MRN: [id number]            FIN: [id number]               Age:   71 years     Sex:  Female     :  1950   Associated Diagnoses:   None   Author:   Rolando Keyes MD          Preoperative Diagnosis: Nuclear sclerotic cataract  Right] eye.    Postoperative Diagnosis: Same.    Anesthesia: Local    Procedure: Phacoemulsification of cataract with posterior chamber implant of the [Right] eye.    This patient is a [  ] year old who was given a diagnosis of severe cataracts of both eyes with [  ] vision [  ]. The risks and benefits of cataract surgery were explained; the patient was consented and desired to have the surgery done. The patient was given topical anesthesia using 1% Lidocaine Jelly and the patient was prepped and draped in sterile fashion.    The microscope was centered and focused in a temporal position and a super sharp blade was used to make a paracentesis in the corneal limbus. Dispersive Viscoelastic was then placed in the anterior chamber. A 2.4 mm keratome blade was used to enter the anterior chamber in a self-sealing type technique. The cystatome blade was then used to initiate a capsulorrhexis which was completed 360 degrees with Utrata forceps. BSS in an AC cannula was then used to perform hydrodissection and hydrodilineation. Phacoemulsification was then accomplished by creating a deep groove down the middle of the nucleus, then  it into 2 halves with the phacotip and the Black chopper and finishing the removal with a stop and chop technique.  The cortex was then removed using the I and A unit. All the lens material was removed without any tears to the anterior or posterior capsule. Cohesive Viscoelastic was then injected to inflate the capsular bag. A foldable lens was then injected into the capsular bag. The lens was observed to be securely placed into the bag. The I and A was then used to remove the remaining viscoelastic. A 10-0 Biosorb  incisional suturewas not] placed. BSS through an A/C cannula was used to perform stromal hydration in the wound. BSS was also used again to reform the chamber and bring the eye to physiologic IOP.  The wound was checked for leaks and none were found. Copious Vigomox drop and 1-2 drops of, Prednisolone Acetate 1% were placed topically prior to removing the lid specular and drapes.  The drapes were then removed. The patient will be sent to recovery and instructed to use Vigamox, Ketorolac, and Predinsolone Acetate 1% three times a day as well as follow all instructions on the postoperative sheet given to them and explained after surgery. The patient will return to see Dr. Keyes at their scheduled appointment within 24-36 hours after surgery.      Rolando Keyes M.D.              SHAWN/jessica           [date / time]           for surgery on 03/16/2022

## 2022-05-14 NOTE — OP NOTE
Patient:   Kim Bah            MRN: [id number]            FIN: [id number]               Age:   71 years     Sex:  Female     :  1950   Associated Diagnoses:   None   Author:   Rolando Keyes MD          Preoperative Diagnosis: Nuclear sclerotic cataract [Left / eye.    Postoperative Diagnosis: Same.    Anesthesia: Local    Procedure: Phacoemulsification of cataract with posterior chamber implant of the [Left/ eye.    This patient is a [  ] year old who was given a diagnosis of severe cataracts of both eyes with [  ] vision [  ]. The risks and benefits of cataract surgery were explained; the patient was consented and desired to have the surgery done. The patient was given topical anesthesia using 1% Lidocaine Jelly and the patient was prepped and draped in sterile fashion.    The microscope was centered and focused in a temporal position and a super sharp blade was used to make a paracentesis in the corneal limbus. Dispersive Viscoelastic was then placed in the anterior chamber. A 2.4 mm keratome blade was used to enter the anterior chamber in a self-sealing type technique. The cystatome blade was then used to initiate a capsulorrhexis which was completed 360 degrees with Utrata forceps. BSS in an AC cannula was then used to perform hydrodissection and hydrodilineation. Phacoemulsification was then accomplished by creating a deep groove down the middle of the nucleus, then  it into 2 halves with the phacotip and the Blcak chopper and finishing the removal with a stop and chop technique.  The cortex was then removed using the I and A unit. All the lens material was removed without any tears to the anterior or posterior capsule. Cohesive Viscoelastic was then injected to inflate the capsular bag. A foldable lens was then injected into the capsular bag. The lens was observed to be securely placed into the bag. The I and A was then used to remove the remaining viscoelastic. A 10-0 Biosorb  incisional suture [was not] placed. BSS through an A/C cannula was used to perform stromal hydration in the wound. BSS was also used again to reform the chamber and bring the eye to physiologic IOP.  The wound was checked for leaks and none were found. Copious Vigomox drop and 1-2 drops of, Prednisolone Acetate 1% were placed topically prior to removing the lid specular and drapes.  The drapes were then removed. The patient will be sent to recovery and instructed to use Vigamox, Ketorolac, and Predinsolone Acetate 1% three times a day as well as follow all instructions on the postoperative sheet given to them and explained after surgery. The patient will return to see Dr. Keyes at their scheduled appointment within 24-36 hours after surgery.      Rolando Keyes M.D.              SHAWN/jessica           [date / time]

## 2022-06-04 DIAGNOSIS — C54.1 MALIGNANT NEOPLASM OF ENDOMETRIUM: Primary | ICD-10-CM

## 2022-06-04 DIAGNOSIS — D05.11 DUCTAL CARCINOMA IN SITU (DCIS) OF RIGHT BREAST: ICD-10-CM

## 2022-06-06 RX ORDER — LETROZOLE 2.5 MG/1
TABLET, FILM COATED ORAL
Qty: 90 TABLET | Refills: 1 | Status: SHIPPED | OUTPATIENT
Start: 2022-06-06 | End: 2023-04-03

## 2022-07-20 ENCOUNTER — HOSPITAL ENCOUNTER (OUTPATIENT)
Dept: RADIOLOGY | Facility: HOSPITAL | Age: 72
Discharge: HOME | End: 2022-07-20
Attending: SURGERY
Payer: MEDICARE

## 2022-07-20 DIAGNOSIS — Z12.31 BREAST CANCER SCREENING BY MAMMOGRAM: ICD-10-CM

## 2022-07-20 PROCEDURE — 77067 SCR MAMMO BI INCL CAD: CPT | Mod: TC

## 2022-07-20 PROCEDURE — 77067 MAMMO DIGITAL SCREENING LEFT WITH TOMO: ICD-10-PCS | Mod: 26,,, | Performed by: RADIOLOGY

## 2022-07-20 PROCEDURE — 77063 MAMMO DIGITAL SCREENING LEFT WITH TOMO: ICD-10-PCS | Mod: 26,,, | Performed by: RADIOLOGY

## 2022-07-20 PROCEDURE — 77063 BREAST TOMOSYNTHESIS BI: CPT | Mod: 26,,, | Performed by: RADIOLOGY

## 2022-07-20 PROCEDURE — 77067 SCR MAMMO BI INCL CAD: CPT | Mod: 26,,, | Performed by: RADIOLOGY

## 2022-07-22 ENCOUNTER — APPOINTMENT (OUTPATIENT)
Dept: RADIOLOGY | Facility: HOSPITAL | Age: 72
End: 2022-07-22
Attending: SURGERY
Payer: MEDICARE

## 2022-07-22 DIAGNOSIS — Z85.3 H/O ADENOCARCINOMA OF BREAST: ICD-10-CM

## 2022-07-22 DIAGNOSIS — R92.30 DENSE BREAST TISSUE: ICD-10-CM

## 2022-07-22 LAB
CREAT SERPL-MCNC: 0.7 MG/DL (ref 0.5–1.4)
SAMPLE: NORMAL

## 2022-07-22 PROCEDURE — 77049 MRI BREAST C-+ W/CAD BI: CPT | Mod: TC

## 2022-07-22 PROCEDURE — 77049 MRI BREAST W/WO CONTRAST, W/CAD, BILATERAL: ICD-10-PCS | Mod: 26,,, | Performed by: RADIOLOGY

## 2022-07-22 PROCEDURE — A9577 INJ MULTIHANCE: HCPCS | Performed by: SURGERY

## 2022-07-22 PROCEDURE — 77049 MRI BREAST C-+ W/CAD BI: CPT | Mod: 26,,, | Performed by: RADIOLOGY

## 2022-07-22 PROCEDURE — 25500020 PHARM REV CODE 255: Performed by: SURGERY

## 2022-07-22 RX ADMIN — GADOBENATE DIMEGLUMINE 15 ML: 529 INJECTION, SOLUTION INTRAVENOUS at 11:07

## 2022-08-08 ENCOUNTER — HOSPITAL ENCOUNTER (OUTPATIENT)
Dept: RADIOLOGY | Facility: HOSPITAL | Age: 72
Discharge: HOME | End: 2022-08-08
Attending: SURGERY
Payer: MEDICARE

## 2022-08-08 DIAGNOSIS — R92.8 ABNORMAL MAMMOGRAM: ICD-10-CM

## 2022-08-08 PROCEDURE — 77065 DX MAMMO INCL CAD UNI: CPT | Mod: 26,LT,, | Performed by: RADIOLOGY

## 2022-08-08 PROCEDURE — 77061 MAMMO DIGITAL DIAGNOSTIC LEFT WITH TOMO: ICD-10-PCS | Mod: 26,LT,, | Performed by: RADIOLOGY

## 2022-08-08 PROCEDURE — 77061 BREAST TOMOSYNTHESIS UNI: CPT | Mod: 26,LT,, | Performed by: RADIOLOGY

## 2022-08-08 PROCEDURE — 77065 DX MAMMO INCL CAD UNI: CPT | Mod: TC,LT

## 2022-08-08 PROCEDURE — 77065 MAMMO DIGITAL DIAGNOSTIC LEFT WITH TOMO: ICD-10-PCS | Mod: 26,LT,, | Performed by: RADIOLOGY

## 2022-10-03 ENCOUNTER — INFUSION (OUTPATIENT)
Dept: INFUSION THERAPY | Facility: HOSPITAL | Age: 72
End: 2022-10-03
Attending: INTERNAL MEDICINE
Payer: MEDICARE

## 2022-10-03 VITALS
HEIGHT: 64 IN | BODY MASS INDEX: 19.81 KG/M2 | HEART RATE: 64 BPM | SYSTOLIC BLOOD PRESSURE: 103 MMHG | DIASTOLIC BLOOD PRESSURE: 67 MMHG | RESPIRATION RATE: 16 BRPM | TEMPERATURE: 98 F | WEIGHT: 116 LBS

## 2022-10-03 DIAGNOSIS — M81.0 AGE-RELATED OSTEOPOROSIS WITHOUT CURRENT PATHOLOGICAL FRACTURE: Primary | ICD-10-CM

## 2022-10-03 DIAGNOSIS — D05.11 DUCTAL CARCINOMA IN SITU (DCIS) OF RIGHT BREAST: ICD-10-CM

## 2022-10-03 PROCEDURE — 96372 THER/PROPH/DIAG INJ SC/IM: CPT

## 2022-10-03 PROCEDURE — 63600175 PHARM REV CODE 636 W HCPCS: Mod: JG | Performed by: INTERNAL MEDICINE

## 2022-10-03 RX ADMIN — DENOSUMAB 60 MG: 60 INJECTION SUBCUTANEOUS at 01:10

## 2022-11-02 ENCOUNTER — LAB VISIT (OUTPATIENT)
Dept: LAB | Facility: HOSPITAL | Age: 72
End: 2022-11-02
Attending: INTERNAL MEDICINE
Payer: MEDICARE

## 2022-11-02 DIAGNOSIS — M81.0 AGE-RELATED OSTEOPOROSIS WITHOUT CURRENT PATHOLOGICAL FRACTURE: ICD-10-CM

## 2022-11-02 DIAGNOSIS — E53.8 B12 DEFICIENCY: ICD-10-CM

## 2022-11-02 DIAGNOSIS — C55 MALIGNANT NEOPLASM OF UTERUS, UNSPECIFIED SITE: ICD-10-CM

## 2022-11-02 DIAGNOSIS — D05.11 DUCTAL CARCINOMA IN SITU (DCIS) OF RIGHT BREAST: ICD-10-CM

## 2022-11-02 DIAGNOSIS — C54.1 MALIGNANT NEOPLASM OF ENDOMETRIUM: ICD-10-CM

## 2022-11-02 LAB
ALBUMIN SERPL-MCNC: 4.5 GM/DL (ref 3.4–4.8)
ALBUMIN/GLOB SERPL: 1.7 RATIO (ref 1.1–2)
ALP SERPL-CCNC: 51 UNIT/L (ref 40–150)
ALT SERPL-CCNC: 16 UNIT/L (ref 0–55)
AST SERPL-CCNC: 21 UNIT/L (ref 5–34)
BASOPHILS # BLD AUTO: 0.05 X10(3)/MCL (ref 0–0.2)
BASOPHILS NFR BLD AUTO: 1 %
BILIRUBIN DIRECT+TOT PNL SERPL-MCNC: 1.2 MG/DL
BUN SERPL-MCNC: 11.1 MG/DL (ref 9.8–20.1)
CALCIUM SERPL-MCNC: 10.1 MG/DL (ref 8.4–10.2)
CANCER AG125 SERPL-ACNC: 7 UNIT/ML (ref 0–35)
CHLORIDE SERPL-SCNC: 105 MMOL/L (ref 98–107)
CO2 SERPL-SCNC: 30 MMOL/L (ref 23–31)
CREAT SERPL-MCNC: 0.79 MG/DL (ref 0.55–1.02)
DEPRECATED CALCIDIOL+CALCIFEROL SERPL-MC: 81.6 NG/ML (ref 30–80)
EOSINOPHIL # BLD AUTO: 0.18 X10(3)/MCL (ref 0–0.9)
EOSINOPHIL NFR BLD AUTO: 3.5 %
ERYTHROCYTE [DISTWIDTH] IN BLOOD BY AUTOMATED COUNT: 12.2 % (ref 11.5–17)
GFR SERPLBLD CREATININE-BSD FMLA CKD-EPI: >60 MLS/MIN/1.73/M2
GLOBULIN SER-MCNC: 2.6 GM/DL (ref 2.4–3.5)
GLUCOSE SERPL-MCNC: 97 MG/DL (ref 82–115)
HCT VFR BLD AUTO: 37 % (ref 37–47)
HGB BLD-MCNC: 12.1 GM/DL (ref 12–16)
IMM GRANULOCYTES # BLD AUTO: 0.01 X10(3)/MCL (ref 0–0.04)
IMM GRANULOCYTES NFR BLD AUTO: 0.2 %
LYMPHOCYTES # BLD AUTO: 1.49 X10(3)/MCL (ref 0.6–4.6)
LYMPHOCYTES NFR BLD AUTO: 28.7 %
MCH RBC QN AUTO: 31.8 PG (ref 27–31)
MCHC RBC AUTO-ENTMCNC: 32.7 MG/DL (ref 33–36)
MCV RBC AUTO: 97.1 FL (ref 80–94)
MONOCYTES # BLD AUTO: 0.47 X10(3)/MCL (ref 0.1–1.3)
MONOCYTES NFR BLD AUTO: 9 %
NEUTROPHILS # BLD AUTO: 3 X10(3)/MCL (ref 2.1–9.2)
NEUTROPHILS NFR BLD AUTO: 57.6 %
PLATELET # BLD AUTO: 191 X10(3)/MCL (ref 130–400)
PMV BLD AUTO: 10.4 FL (ref 7.4–10.4)
POTASSIUM SERPL-SCNC: 4.5 MMOL/L (ref 3.5–5.1)
PROT SERPL-MCNC: 7.1 GM/DL (ref 5.8–7.6)
RBC # BLD AUTO: 3.81 X10(6)/MCL (ref 4.2–5.4)
SODIUM SERPL-SCNC: 140 MMOL/L (ref 136–145)
VIT B12 SERPL-MCNC: 838 PG/ML (ref 213–816)
WBC # SPEC AUTO: 5.2 X10(3)/MCL (ref 4.5–11.5)

## 2022-11-02 PROCEDURE — 85025 COMPLETE CBC W/AUTO DIFF WBC: CPT

## 2022-11-02 PROCEDURE — 86304 IMMUNOASSAY TUMOR CA 125: CPT

## 2022-11-02 PROCEDURE — 80053 COMPREHEN METABOLIC PANEL: CPT

## 2022-11-02 PROCEDURE — 82306 VITAMIN D 25 HYDROXY: CPT

## 2022-11-02 PROCEDURE — 36415 COLL VENOUS BLD VENIPUNCTURE: CPT

## 2022-11-02 PROCEDURE — 82607 VITAMIN B-12: CPT

## 2022-11-09 ENCOUNTER — OFFICE VISIT (OUTPATIENT)
Dept: HEMATOLOGY/ONCOLOGY | Facility: CLINIC | Age: 72
End: 2022-11-09
Payer: MEDICARE

## 2022-11-09 VITALS
SYSTOLIC BLOOD PRESSURE: 121 MMHG | BODY MASS INDEX: 20.03 KG/M2 | TEMPERATURE: 98 F | WEIGHT: 117.31 LBS | HEIGHT: 64 IN | HEART RATE: 71 BPM | DIASTOLIC BLOOD PRESSURE: 76 MMHG | OXYGEN SATURATION: 98 %

## 2022-11-09 DIAGNOSIS — E53.8 B12 DEFICIENCY: ICD-10-CM

## 2022-11-09 DIAGNOSIS — D05.11 DUCTAL CARCINOMA IN SITU (DCIS) OF RIGHT BREAST: ICD-10-CM

## 2022-11-09 DIAGNOSIS — C54.1 MALIGNANT NEOPLASM OF ENDOMETRIUM: ICD-10-CM

## 2022-11-09 DIAGNOSIS — C54.1 MALIGNANT NEOPLASM OF ENDOMETRIUM: Primary | ICD-10-CM

## 2022-11-09 DIAGNOSIS — M81.0 AGE-RELATED OSTEOPOROSIS WITHOUT CURRENT PATHOLOGICAL FRACTURE: Primary | ICD-10-CM

## 2022-11-09 PROCEDURE — 99999 PR PBB SHADOW E&M-EST. PATIENT-LVL III: ICD-10-PCS | Mod: PBBFAC,,, | Performed by: NURSE PRACTITIONER

## 2022-11-09 PROCEDURE — 99213 OFFICE O/P EST LOW 20 MIN: CPT | Mod: PBBFAC | Performed by: NURSE PRACTITIONER

## 2022-11-09 PROCEDURE — 99213 PR OFFICE/OUTPT VISIT, EST, LEVL III, 20-29 MIN: ICD-10-PCS | Mod: S$PBB,,, | Performed by: NURSE PRACTITIONER

## 2022-11-09 PROCEDURE — 99999 PR PBB SHADOW E&M-EST. PATIENT-LVL III: CPT | Mod: PBBFAC,,, | Performed by: NURSE PRACTITIONER

## 2022-11-09 PROCEDURE — 99213 OFFICE O/P EST LOW 20 MIN: CPT | Mod: S$PBB,,, | Performed by: NURSE PRACTITIONER

## 2022-11-09 NOTE — PROGRESS NOTES
Answers submitted by the patient for this visit:  Review of Systems Questionnaire (Submitted on 11/2/2022)  appetite change : No  unexpected weight change: No  mouth sores: No  visual disturbance: No  adenopathy: No           Heme/Onc Progress Note    PATIENT: Kim Bah  MRN: [id number]  DATE: 11/9/2022  Chief Complaint: Follow-up (6 month f/u)    Current Treatment: Aromasin 2/25/21  DENOSUMAB (PROLIA) Q6M     Oncology History   Ductal carcinoma in situ (DCIS) of right breast   6/9/2020 Initial Diagnosis    Treatment History:  6/9/20: right breast biopsy   Pathology: DCIS ER 96%NH 87%    8/4/20:Genetic Testing: no clinical significant variant detected, no variants of unknown significance  8/7/2020: Lumpectomy right breast 1 o'clock position   Pathology: Ductal carcinoma in situ grade 2 with focal comedonecrosis, no invasive malignancy, involves the posterior margin and anterior superior medial margins,   ER 96%NH 87%   2/1/21: Right simple mastectomy. DCIS, multifocal (X5) at the periphery of the previous lumpectomy site.  THE LARGEST FOCUS OF DCIS IS 6 MM.  SKIN / NIPPLE AND MARGINS CLEAR (> 1 CM.).  ONE SENTINEL LYMPH NODE, NEGATIVE FOR METASTATIC CARCINOMA.  AXILLARY CONTENTS:TWO LYMPH NODES, NEGATIVE FOR METASTATIC CARCINOMA.  ADDITIONAL SKIN:NEGATIVE FOR CARCINOMA.  pTis, pN0     2/25/2021 -  Hormone Therapy    Aromasin     Malignant neoplasm of endometrium   6/4/2020 Initial Diagnosis    Malignant neoplasm of endometrium     6/19/2020 Surgery    6/19/20: Total hysterectomy, bilateral salpingo-oophorectomy, hysterectomy lymphadenectomy, peritoneal washings, omental biopsy   Pathology: Poorly differentiated carcinoma with clear cell features, High-grade    Arises within an endometrial polyp, 5.1 cm    Omental biopsy negative    Lymph nodes negative    Peritoneal washings negative8/10/20: Completed vaginal brachytherapy      8/4/2020 Genetic Testing    8/4/20:Genetic Testing: no clinical significant variant  detected, no variants of unknown significance     8/18/2020 - 12/2/2020 Chemotherapy    8/18/20-12/2/20: Completed 6 cycles of Carboplatin AUC of 6 and Taxol 175mg/m2 q 3 weeks         11/09/2022  HPI    Past Medical History:   Diagnosis Date    Aneurysm of iliac artery     Cancer, uterine     DCIS (ductal carcinoma in situ) right breast     Deficiency of vitamin B12     Osteoporosis of lumbar spine     Port-A-Cath in place     removed    Pulmonary nodules         Current Outpatient Medications:     calcium carbonate (OS-SANDI) 600 mg calcium (1,500 mg) Tab, Take 600 mg by mouth once., Disp: , Rfl:     cyanocobalamin 1,000 mcg/mL injection, , Disp: , Rfl:     denosumab (PROLIA) 60 mg/mL Syrg, Inject 60 mg into the skin., Disp: , Rfl:     letrozole (FEMARA) 2.5 mg Tab, TAKE 1 TABLET EVERY DAY, Disp: 90 tablet, Rfl: 1    vitamin D (VITAMIN D3) 1000 units Tab, Take 2,000 Units by mouth once daily., Disp: , Rfl:      Review of Systems:   Pertinent positives and negatives included in the HPI. Otherwise a complete review of   systems is negative.  Review of Systems   Respiratory:  Negative for cough and shortness of breath.    Cardiovascular:  Negative for chest pain.   Gastrointestinal:  Negative for abdominal pain and diarrhea.   Genitourinary:  Negative for frequency.   Musculoskeletal:  Negative for back pain.   Skin:  Negative for rash.   Neurological:  Negative for headaches.   Psychiatric/Behavioral:  The patient is not nervous/anxious.         Objective:     Vitals:    11/09/22 1059   BP: 121/76   Pulse: 71   Temp: 97.6 °F (36.4 °C)         Physical Exam  Constitutional:       Appearance: Normal appearance.   HENT:      Head: Normocephalic and atraumatic.      Nose: Nose normal.      Mouth/Throat:      Mouth: Mucous membranes are moist.   Eyes:      Extraocular Movements: Extraocular movements intact.      Conjunctiva/sclera: Conjunctivae normal.      Pupils: Pupils are equal, round, and reactive to light.    Cardiovascular:      Rate and Rhythm: Normal rate and regular rhythm.      Pulses: Normal pulses.   Pulmonary:      Effort: Pulmonary effort is normal.      Breath sounds: Normal breath sounds.   Chest:      Comments: Right breast absent. Left breast with no palpable abnormalities, no skin or nipple changes. No LAD  Abdominal:      General: Bowel sounds are normal.      Palpations: Abdomen is soft.   Musculoskeletal:      Cervical back: Normal range of motion and neck supple.   Neurological:      General: No focal deficit present.      Mental Status: She is alert and oriented to person, place, and time. Mental status is at baseline.   Psychiatric:         Mood and Affect: Mood normal.         Behavior: Behavior normal.            Assessment and Plan      Malignant neoplasm of uterus, unspecified site    -  Primary     Pulmonary nodules/lesions, multiple         B12 deficiency        Ductal carcinoma in situ (DCIS) of right breast   Osteoporosis    Continue Aromasin until February of 2026  Prolia due 4/02/23. Continue until 4/2024-                   Repeat Bone density March of 2023  Continue B12 monthly  Repeat imaging CAP May of 2023     Screening mammogram in July 2023 is recommended.       Follow up in about 6 months (around 5/9/2023) for labs (CBC, CMP, ) and visit with Dr. Harper to review imaging.

## 2023-02-01 ENCOUNTER — DOCUMENTATION ONLY (OUTPATIENT)
Dept: ADMINISTRATIVE | Facility: HOSPITAL | Age: 73
End: 2023-02-01
Payer: MEDICARE

## 2023-03-07 ENCOUNTER — PATIENT MESSAGE (OUTPATIENT)
Dept: HEMATOLOGY/ONCOLOGY | Facility: CLINIC | Age: 73
End: 2023-03-07
Payer: MEDICARE

## 2023-03-07 DIAGNOSIS — M81.0 AGE-RELATED OSTEOPOROSIS WITHOUT CURRENT PATHOLOGICAL FRACTURE: Primary | ICD-10-CM

## 2023-03-13 ENCOUNTER — HOSPITAL ENCOUNTER (OUTPATIENT)
Dept: RADIOLOGY | Facility: HOSPITAL | Age: 73
Discharge: HOME | End: 2023-03-13
Attending: NURSE PRACTITIONER
Payer: MEDICARE

## 2023-03-13 DIAGNOSIS — M81.0 AGE-RELATED OSTEOPOROSIS WITHOUT CURRENT PATHOLOGICAL FRACTURE: ICD-10-CM

## 2023-03-13 PROCEDURE — 77080 DXA BONE DENSITY AXIAL: CPT | Mod: 26,,, | Performed by: RADIOLOGY

## 2023-03-13 PROCEDURE — 77080 DXA BONE DENSITY AXIAL SKELETON 1 OR MORE SITES: ICD-10-PCS | Mod: 26,,, | Performed by: RADIOLOGY

## 2023-03-13 PROCEDURE — 77080 DXA BONE DENSITY AXIAL: CPT | Mod: TC

## 2023-04-10 ENCOUNTER — INFUSION (OUTPATIENT)
Dept: INFUSION THERAPY | Facility: HOSPITAL | Age: 73
End: 2023-04-10
Attending: INTERNAL MEDICINE
Payer: MEDICARE

## 2023-04-10 VITALS
OXYGEN SATURATION: 97 % | DIASTOLIC BLOOD PRESSURE: 71 MMHG | HEART RATE: 62 BPM | RESPIRATION RATE: 18 BRPM | TEMPERATURE: 98 F | SYSTOLIC BLOOD PRESSURE: 110 MMHG

## 2023-04-10 DIAGNOSIS — M81.0 AGE-RELATED OSTEOPOROSIS WITHOUT CURRENT PATHOLOGICAL FRACTURE: Primary | ICD-10-CM

## 2023-04-10 DIAGNOSIS — D05.11 DUCTAL CARCINOMA IN SITU (DCIS) OF RIGHT BREAST: ICD-10-CM

## 2023-04-10 PROCEDURE — 63600175 PHARM REV CODE 636 W HCPCS: Mod: JZ,JG | Performed by: INTERNAL MEDICINE

## 2023-04-10 PROCEDURE — 96372 THER/PROPH/DIAG INJ SC/IM: CPT

## 2023-04-10 RX ADMIN — DENOSUMAB 60 MG: 60 INJECTION SUBCUTANEOUS at 01:04

## 2023-04-25 ENCOUNTER — LAB VISIT (OUTPATIENT)
Dept: LAB | Facility: HOSPITAL | Age: 73
End: 2023-04-25
Attending: INTERNAL MEDICINE
Payer: MEDICARE

## 2023-04-25 DIAGNOSIS — C55 MALIGNANT NEOPLASM OF UTERUS, UNSPECIFIED SITE: ICD-10-CM

## 2023-04-25 DIAGNOSIS — C54.1 MALIGNANT NEOPLASM OF ENDOMETRIUM: ICD-10-CM

## 2023-04-25 DIAGNOSIS — D05.11 DUCTAL CARCINOMA IN SITU (DCIS) OF RIGHT BREAST: ICD-10-CM

## 2023-04-25 DIAGNOSIS — E53.8 B12 DEFICIENCY: ICD-10-CM

## 2023-04-25 DIAGNOSIS — M81.0 AGE-RELATED OSTEOPOROSIS WITHOUT CURRENT PATHOLOGICAL FRACTURE: ICD-10-CM

## 2023-04-25 LAB
ALBUMIN SERPL-MCNC: 4.5 G/DL (ref 3.4–4.8)
ALBUMIN/GLOB SERPL: 1.7 RATIO (ref 1.1–2)
ALP SERPL-CCNC: 53 UNIT/L (ref 40–150)
ALT SERPL-CCNC: 23 UNIT/L (ref 0–55)
AST SERPL-CCNC: 29 UNIT/L (ref 5–34)
BASOPHILS # BLD AUTO: 0.05 X10(3)/MCL (ref 0–0.2)
BASOPHILS NFR BLD AUTO: 1 %
BILIRUBIN DIRECT+TOT PNL SERPL-MCNC: 0.9 MG/DL
BUN SERPL-MCNC: 14.1 MG/DL (ref 9.8–20.1)
CALCIUM SERPL-MCNC: 10.1 MG/DL (ref 8.4–10.2)
CANCER AG125 SERPL-ACNC: 6.7 UNIT/ML (ref 0–35)
CHLORIDE SERPL-SCNC: 104 MMOL/L (ref 98–107)
CO2 SERPL-SCNC: 31 MMOL/L (ref 23–31)
CREAT SERPL-MCNC: 0.7 MG/DL (ref 0.55–1.02)
DEPRECATED CALCIDIOL+CALCIFEROL SERPL-MC: 96.6 NG/ML (ref 30–80)
EOSINOPHIL # BLD AUTO: 0.19 X10(3)/MCL (ref 0–0.9)
EOSINOPHIL NFR BLD AUTO: 3.9 %
ERYTHROCYTE [DISTWIDTH] IN BLOOD BY AUTOMATED COUNT: 12.3 % (ref 11.5–17)
GFR SERPLBLD CREATININE-BSD FMLA CKD-EPI: >60 MLS/MIN/1.73/M2
GLOBULIN SER-MCNC: 2.7 GM/DL (ref 2.4–3.5)
GLUCOSE SERPL-MCNC: 99 MG/DL (ref 82–115)
HCT VFR BLD AUTO: 37.8 % (ref 37–47)
HGB BLD-MCNC: 12.1 G/DL (ref 12–16)
IMM GRANULOCYTES # BLD AUTO: 0.01 X10(3)/MCL (ref 0–0.04)
IMM GRANULOCYTES NFR BLD AUTO: 0.2 %
LYMPHOCYTES # BLD AUTO: 1.35 X10(3)/MCL (ref 0.6–4.6)
LYMPHOCYTES NFR BLD AUTO: 28 %
MCH RBC QN AUTO: 31.5 PG (ref 27–31)
MCHC RBC AUTO-ENTMCNC: 32 G/DL (ref 33–36)
MCV RBC AUTO: 98.4 FL (ref 80–94)
MONOCYTES # BLD AUTO: 0.39 X10(3)/MCL (ref 0.1–1.3)
MONOCYTES NFR BLD AUTO: 8.1 %
NEUTROPHILS # BLD AUTO: 2.84 X10(3)/MCL (ref 2.1–9.2)
NEUTROPHILS NFR BLD AUTO: 58.8 %
PLATELET # BLD AUTO: 203 X10(3)/MCL (ref 130–400)
PMV BLD AUTO: 10.4 FL (ref 7.4–10.4)
POTASSIUM SERPL-SCNC: 4.3 MMOL/L (ref 3.5–5.1)
PROT SERPL-MCNC: 7.2 GM/DL (ref 5.8–7.6)
RBC # BLD AUTO: 3.84 X10(6)/MCL (ref 4.2–5.4)
SODIUM SERPL-SCNC: 142 MMOL/L (ref 136–145)
VIT B12 SERPL-MCNC: 890 PG/ML (ref 213–816)
WBC # SPEC AUTO: 4.8 X10(3)/MCL (ref 4.5–11.5)

## 2023-04-25 PROCEDURE — 80053 COMPREHEN METABOLIC PANEL: CPT

## 2023-04-25 PROCEDURE — 36415 COLL VENOUS BLD VENIPUNCTURE: CPT

## 2023-04-25 PROCEDURE — 82607 VITAMIN B-12: CPT

## 2023-04-25 PROCEDURE — 86304 IMMUNOASSAY TUMOR CA 125: CPT

## 2023-04-25 PROCEDURE — 82306 VITAMIN D 25 HYDROXY: CPT

## 2023-04-25 PROCEDURE — 85025 COMPLETE CBC W/AUTO DIFF WBC: CPT

## 2023-05-02 ENCOUNTER — HOSPITAL ENCOUNTER (OUTPATIENT)
Dept: RADIOLOGY | Facility: HOSPITAL | Age: 73
Discharge: HOME | End: 2023-05-02
Attending: NURSE PRACTITIONER
Payer: MEDICARE

## 2023-05-02 DIAGNOSIS — C54.1 MALIGNANT NEOPLASM OF ENDOMETRIUM: ICD-10-CM

## 2023-05-02 PROCEDURE — 74177 CT ABD & PELVIS W/CONTRAST: CPT | Mod: TC

## 2023-05-02 PROCEDURE — 25500020 PHARM REV CODE 255: Performed by: NURSE PRACTITIONER

## 2023-05-02 PROCEDURE — 71260 CT THORAX DX C+: CPT | Mod: TC

## 2023-05-02 RX ADMIN — IOPAMIDOL 100 ML: 755 INJECTION, SOLUTION INTRAVENOUS at 08:05

## 2023-05-02 RX ADMIN — DIATRIZOATE MEGLUMINE AND DIATRIZOATE SODIUM 30 ML: 660; 100 LIQUID ORAL; RECTAL at 08:05

## 2023-05-08 NOTE — PROGRESS NOTES
Heme/Onc Progress Note    PATIENT: Kim Bah  MRN: [id number]  DATE: 5/9/2023  Chief Complaint: no concerns. follow up ct results      Current Treatment: Letrozole 2/25/21  DENOSUMAB (PROLIA) Q6M     Oncology History   Ductal carcinoma in situ (DCIS) of right breast   6/9/2020 Initial Diagnosis    Treatment History:  6/9/20: right breast biopsy   Pathology: DCIS ER 96%NJ 87%    8/4/20:Genetic Testing: no clinical significant variant detected, no variants of unknown significance  8/7/2020: Lumpectomy right breast 1 o'clock position   Pathology: Ductal carcinoma in situ grade 2 with focal comedonecrosis, no invasive malignancy, involves the posterior margin and anterior superior medial margins,   ER 96%NJ 87%   2/1/21: Right simple mastectomy. DCIS, multifocal (X5) at the periphery of the previous lumpectomy site.  THE LARGEST FOCUS OF DCIS IS 6 MM.  SKIN / NIPPLE AND MARGINS CLEAR (> 1 CM.).  ONE SENTINEL LYMPH NODE, NEGATIVE FOR METASTATIC CARCINOMA.  AXILLARY CONTENTS:TWO LYMPH NODES, NEGATIVE FOR METASTATIC CARCINOMA.  ADDITIONAL SKIN:NEGATIVE FOR CARCINOMA.  pTis, pN0     2/25/2021 -  Hormone Therapy    Aromasin     Malignant neoplasm of endometrium   6/4/2020 Initial Diagnosis    Malignant neoplasm of endometrium     6/19/2020 Surgery    6/19/20: Total hysterectomy, bilateral salpingo-oophorectomy, hysterectomy lymphadenectomy, peritoneal washings, omental biopsy   Pathology: Poorly differentiated carcinoma with clear cell features, High-grade    Arises within an endometrial polyp, 5.1 cm    Omental biopsy negative    Lymph nodes negative    Peritoneal washings negative8/10/20: Completed vaginal brachytherapy      8/4/2020 Genetic Testing    8/4/20:Genetic Testing: no clinical significant variant detected, no variants of unknown significance     8/18/2020 - 12/2/2020 Chemotherapy    8/18/20-12/2/20: Completed 6 cycles of Carboplatin AUC of 6 and Taxol 175mg/m2 q 3 weeks         05/09/2023  Today for  routine follow-up.  Noted mildly elevated hemoglobin A1c in the past, she is changed her diet, she is exercising, she sees her primary care.  No breast pain, no vaginal bleeding or discharge, tolerating her aromatase inhibitor well,  Reviewed repeat CT scan, reviewed bone density, repeat CT scan 05/02/2023 shows stable 6 mm pulmonary nodules compared to the scan a year ago, no evidence of malignant disease, bone density on March 23rd showed improving bone density with Prolia but she still has osteoporosis, she continues to follow vascular surgery for her aneurysms and was seen in the [facility] vascular clinic in February.    Past Medical History:   Diagnosis Date    Aneurysm of iliac artery     Cancer, uterine     DCIS (ductal carcinoma in situ) right breast     Deficiency of vitamin B12     Osteoporosis of lumbar spine     Port-A-Cath in place     removed    Pulmonary nodules         Current Outpatient Medications:     calcium carbonate (OS-SANDI) 600 mg calcium (1,500 mg) Tab, Take 600 mg by mouth once., Disp: , Rfl:     cyanocobalamin 1,000 mcg/mL injection, INJECT 1 ML (1000 MCG) INTRAMUSCULARLY EVERY MONTH, Disp: 3 mL, Rfl: 1    denosumab (PROLIA) 60 mg/mL Syrg, Inject 60 mg into the skin., Disp: , Rfl:     letrozole (FEMARA) 2.5 mg Tab, TAKE 1 TABLET EVERY DAY, Disp: 90 tablet, Rfl: 0    vitamin D (VITAMIN D3) 1000 units Tab, Take 2,000 Units by mouth once daily., Disp: , Rfl:      Review of Systems:   Pertinent positives and negatives included in the HPI. Otherwise a complete review of   systems is negative.  Review of Systems   Constitutional:  Negative for appetite change and unexpected weight change.   HENT:  Negative for mouth sores.    Eyes:  Negative for visual disturbance.   Respiratory:  Negative for cough and shortness of breath.    Cardiovascular:  Negative for chest pain.   Gastrointestinal:  Negative for abdominal pain and diarrhea.   Genitourinary:  Negative for frequency.   Musculoskeletal:   Negative for back pain.   Skin:  Negative for rash.   Neurological:  Negative for headaches.   Hematological:  Negative for adenopathy.   Psychiatric/Behavioral:  The patient is not nervous/anxious.           Objective:     Vitals:    05/09/23 1120   BP: (!) 120/51   Pulse: (!) 44   Temp: 97.6 °F (36.4 °C)           Physical Exam  Constitutional:       Appearance: Normal appearance.   HENT:      Head: Normocephalic and atraumatic.      Nose: Nose normal.      Mouth/Throat:      Mouth: Mucous membranes are moist.   Eyes:      Extraocular Movements: Extraocular movements intact.      Conjunctiva/sclera: Conjunctivae normal.      Pupils: Pupils are equal, round, and reactive to light.   Cardiovascular:      Rate and Rhythm: Normal rate and regular rhythm.      Pulses: Normal pulses.   Pulmonary:      Effort: Pulmonary effort is normal.      Breath sounds: Normal breath sounds.   Chest:      Comments: Breast exam deferred this visit  Abdominal:      General: Bowel sounds are normal.      Palpations: Abdomen is soft.   Musculoskeletal:      Cervical back: Normal range of motion and neck supple.   Neurological:      General: No focal deficit present.      Mental Status: She is alert and oriented to person, place, and time. Mental status is at baseline.   Psychiatric:         Mood and Affect: Mood normal.         Behavior: Behavior normal.         Lab Review:  CBC:   Recent Labs     04/25/23  1016   WBC 4.8   RBC 3.84*   HGB 12.1   HCT 37.8        CMP:   Recent Labs     04/25/23  1016      K 4.3   CO2 31   BUN 14.1   CREATININE 0.70   CALCIUM 10.1   ALBUMIN 4.5   BILITOT 0.9   ALKPHOS 53   AST 29   ALT 23     Iron:   Recent Labs     04/25/23  1016   [id number]*         Assessment and Plan      Malignant neoplasm of uterus, unspecified site    -  Primary     Pulmonary nodules/lesions, multiple         B12 deficiency        Ductal carcinoma in situ (DCIS) of right breast   Osteoporosis    Continue Aromatase  inhibitory  until February of 2026  Prolia due 4/02/23. Continue until 4/2025                  Bone density March of 2023- Osteoporosis, however bone density is a little better repeat in March of 2025  Continue B12 monthly  Repeat imaging CAP 5/2024 --nodules first seen 10/2020  Screening mammogram in July 2023 is recommended.       Follow up in about 6 months (around 11/9/2023) for labs a week prior.      Ethan Harper MD

## 2023-05-09 ENCOUNTER — OFFICE VISIT (OUTPATIENT)
Dept: HEMATOLOGY/ONCOLOGY | Facility: CLINIC | Age: 73
End: 2023-05-09
Payer: MEDICARE

## 2023-05-09 VITALS
TEMPERATURE: 98 F | BODY MASS INDEX: 21.29 KG/M2 | HEIGHT: 63 IN | SYSTOLIC BLOOD PRESSURE: 120 MMHG | DIASTOLIC BLOOD PRESSURE: 51 MMHG | HEART RATE: 44 BPM | WEIGHT: 120.13 LBS | OXYGEN SATURATION: 99 %

## 2023-05-09 DIAGNOSIS — C54.1 MALIGNANT NEOPLASM OF ENDOMETRIUM: Primary | ICD-10-CM

## 2023-05-09 DIAGNOSIS — D05.11 DUCTAL CARCINOMA IN SITU (DCIS) OF RIGHT BREAST: ICD-10-CM

## 2023-05-09 DIAGNOSIS — M81.0 AGE-RELATED OSTEOPOROSIS WITHOUT CURRENT PATHOLOGICAL FRACTURE: ICD-10-CM

## 2023-05-09 DIAGNOSIS — R91.8 MULTIPLE PULMONARY NODULES: ICD-10-CM

## 2023-05-09 DIAGNOSIS — E53.8 VITAMIN B12 DEFICIENCY: ICD-10-CM

## 2023-05-09 PROCEDURE — 99214 OFFICE O/P EST MOD 30 MIN: CPT | Mod: PBBFAC | Performed by: INTERNAL MEDICINE

## 2023-05-09 PROCEDURE — 99999 PR PBB SHADOW E&M-EST. PATIENT-LVL IV: ICD-10-PCS | Mod: PBBFAC,,, | Performed by: INTERNAL MEDICINE

## 2023-05-09 PROCEDURE — 99999 PR PBB SHADOW E&M-EST. PATIENT-LVL IV: CPT | Mod: PBBFAC,,, | Performed by: INTERNAL MEDICINE

## 2023-05-09 PROCEDURE — 99214 PR OFFICE/OUTPT VISIT, EST, LEVL IV, 30-39 MIN: ICD-10-PCS | Mod: S$PBB,,, | Performed by: INTERNAL MEDICINE

## 2023-05-09 PROCEDURE — 99214 OFFICE O/P EST MOD 30 MIN: CPT | Mod: S$PBB,,, | Performed by: INTERNAL MEDICINE

## 2023-06-20 DIAGNOSIS — Z12.31 VISIT FOR SCREENING MAMMOGRAM: Primary | ICD-10-CM

## 2023-06-20 DIAGNOSIS — Z91.89 AT HIGH RISK FOR BREAST CANCER: ICD-10-CM

## 2023-06-23 ENCOUNTER — DOCUMENTATION ONLY (OUTPATIENT)
Dept: SURGICAL ONCOLOGY | Facility: CLINIC | Age: 73
End: 2023-06-23
Payer: MEDICARE

## 2023-06-23 NOTE — PROGRESS NOTES
06/23/23   MMG is scheduled for August 10 @2:15 @[facility]. Spoke w. Ms. Ayan in scheduling who got in touch with Kim at [facility] who stated the patient cannot get scheduled for the MRI right now because they have to follow radiologist recommendations and the radiologist recommended alternating scans. Her MRI will be done 6 months after MMG. Kinga CHERRY notified patient for me. cpl

## 2023-08-03 ENCOUNTER — APPOINTMENT (OUTPATIENT)
Dept: RADIOLOGY | Facility: HOSPITAL | Age: 73
End: 2023-08-03
Attending: SURGERY
Payer: MEDICARE

## 2023-08-03 DIAGNOSIS — Z91.89 AT HIGH RISK FOR BREAST CANCER: ICD-10-CM

## 2023-08-03 DIAGNOSIS — Z12.31 VISIT FOR SCREENING MAMMOGRAM: ICD-10-CM

## 2023-08-03 LAB
CREAT SERPL-MCNC: 0.8 MG/DL (ref 0.5–1.4)
SAMPLE: NORMAL

## 2023-08-03 PROCEDURE — 77049 MRI BREAST C-+ W/CAD BI: CPT | Mod: TC

## 2023-08-03 PROCEDURE — A9577 INJ MULTIHANCE: HCPCS | Performed by: SURGERY

## 2023-08-03 PROCEDURE — 77049 MRI BREAST W/WO CONTRAST, W/CAD, BILATERAL: ICD-10-PCS | Mod: 26,,, | Performed by: STUDENT IN AN ORGANIZED HEALTH CARE EDUCATION/TRAINING PROGRAM

## 2023-08-03 PROCEDURE — 25500020 PHARM REV CODE 255: Performed by: SURGERY

## 2023-08-03 PROCEDURE — 77049 MRI BREAST C-+ W/CAD BI: CPT | Mod: 26,,, | Performed by: STUDENT IN AN ORGANIZED HEALTH CARE EDUCATION/TRAINING PROGRAM

## 2023-08-03 RX ADMIN — GADOBENATE DIMEGLUMINE 15 ML: 529 INJECTION, SOLUTION INTRAVENOUS at 10:08

## 2023-08-10 ENCOUNTER — HOSPITAL ENCOUNTER (OUTPATIENT)
Dept: RADIOLOGY | Facility: HOSPITAL | Age: 73
Discharge: HOME | End: 2023-08-10
Attending: SURGERY
Payer: MEDICARE

## 2023-08-10 DIAGNOSIS — Z12.31 VISIT FOR SCREENING MAMMOGRAM: ICD-10-CM

## 2023-08-10 PROCEDURE — 77063 MAMMO DIGITAL SCREENING LEFT WITH TOMO: ICD-10-PCS | Mod: 26,52,, | Performed by: RADIOLOGY

## 2023-08-10 PROCEDURE — 77067 SCR MAMMO BI INCL CAD: CPT | Mod: TC,52

## 2023-08-10 PROCEDURE — 77063 BREAST TOMOSYNTHESIS BI: CPT | Mod: 26,52,, | Performed by: RADIOLOGY

## 2023-08-10 PROCEDURE — 77067 MAMMO DIGITAL SCREENING LEFT WITH TOMO: ICD-10-PCS | Mod: 26,52,, | Performed by: RADIOLOGY

## 2023-08-10 PROCEDURE — 77067 SCR MAMMO BI INCL CAD: CPT | Mod: 26,52,, | Performed by: RADIOLOGY

## 2023-09-22 DIAGNOSIS — C54.1 MALIGNANT NEOPLASM OF ENDOMETRIUM: ICD-10-CM

## 2023-09-22 DIAGNOSIS — D05.11 DUCTAL CARCINOMA IN SITU (DCIS) OF RIGHT BREAST: ICD-10-CM

## 2023-09-25 RX ORDER — LETROZOLE 2.5 MG/1
TABLET, FILM COATED ORAL
Qty: 90 TABLET | Refills: 0 | Status: SHIPPED | OUTPATIENT
Start: 2023-09-25 | End: 2024-02-28

## 2023-09-25 NOTE — TELEPHONE ENCOUNTER
I am confused about this medication request. Her chart states that she has been on Aromasin since 2021, but the only medication that was given to her is the Letrozole. Which one is correct?

## 2023-10-09 ENCOUNTER — INFUSION (OUTPATIENT)
Dept: INFUSION THERAPY | Facility: HOSPITAL | Age: 73
End: 2023-10-09
Attending: INTERNAL MEDICINE
Payer: MEDICARE

## 2023-10-09 VITALS
RESPIRATION RATE: 18 BRPM | TEMPERATURE: 98 F | DIASTOLIC BLOOD PRESSURE: 78 MMHG | HEART RATE: 85 BPM | SYSTOLIC BLOOD PRESSURE: 151 MMHG

## 2023-10-09 DIAGNOSIS — M81.0 AGE-RELATED OSTEOPOROSIS WITHOUT CURRENT PATHOLOGICAL FRACTURE: Primary | ICD-10-CM

## 2023-10-09 PROCEDURE — 96372 THER/PROPH/DIAG INJ SC/IM: CPT

## 2023-10-09 PROCEDURE — 63600175 PHARM REV CODE 636 W HCPCS: Mod: JZ,JG | Performed by: INTERNAL MEDICINE

## 2023-10-09 RX ADMIN — DENOSUMAB 60 MG: 60 INJECTION SUBCUTANEOUS at 08:10

## 2023-11-02 ENCOUNTER — LAB VISIT (OUTPATIENT)
Dept: LAB | Facility: HOSPITAL | Age: 73
End: 2023-11-02
Attending: INTERNAL MEDICINE
Payer: MEDICARE

## 2023-11-02 DIAGNOSIS — M81.0 AGE-RELATED OSTEOPOROSIS WITHOUT CURRENT PATHOLOGICAL FRACTURE: ICD-10-CM

## 2023-11-02 DIAGNOSIS — D05.11 DUCTAL CARCINOMA IN SITU (DCIS) OF RIGHT BREAST: ICD-10-CM

## 2023-11-02 DIAGNOSIS — E53.8 VITAMIN B12 DEFICIENCY: ICD-10-CM

## 2023-11-02 DIAGNOSIS — C54.1 MALIGNANT NEOPLASM OF ENDOMETRIUM: ICD-10-CM

## 2023-11-02 LAB
ALBUMIN SERPL-MCNC: 4.4 G/DL (ref 3.4–4.8)
ALBUMIN/GLOB SERPL: 1.8 RATIO (ref 1.1–2)
ALP SERPL-CCNC: 48 UNIT/L (ref 40–150)
ALT SERPL-CCNC: 22 UNIT/L (ref 0–55)
AST SERPL-CCNC: 27 UNIT/L (ref 5–34)
BASOPHILS # BLD AUTO: 0.06 X10(3)/MCL
BASOPHILS NFR BLD AUTO: 1 %
BILIRUB SERPL-MCNC: 1.2 MG/DL
BUN SERPL-MCNC: 12 MG/DL (ref 9.8–20.1)
CALCIUM SERPL-MCNC: 9.5 MG/DL (ref 8.4–10.2)
CANCER AG125 SERPL-ACNC: 8.2 UNIT/ML (ref 0–35)
CHLORIDE SERPL-SCNC: 105 MMOL/L (ref 98–107)
CO2 SERPL-SCNC: 29 MMOL/L (ref 23–31)
CREAT SERPL-MCNC: 0.8 MG/DL (ref 0.55–1.02)
DEPRECATED CALCIDIOL+CALCIFEROL SERPL-MC: 110.2 NG/ML (ref 30–80)
EOSINOPHIL # BLD AUTO: 0.27 X10(3)/MCL (ref 0–0.9)
EOSINOPHIL NFR BLD AUTO: 4.7 %
ERYTHROCYTE [DISTWIDTH] IN BLOOD BY AUTOMATED COUNT: 12.7 % (ref 11.5–17)
GFR SERPLBLD CREATININE-BSD FMLA CKD-EPI: >60 MLS/MIN/1.73/M2
GLOBULIN SER-MCNC: 2.5 GM/DL (ref 2.4–3.5)
GLUCOSE SERPL-MCNC: 96 MG/DL (ref 82–115)
HCT VFR BLD AUTO: 38 % (ref 37–47)
HGB BLD-MCNC: 12 G/DL (ref 12–16)
IMM GRANULOCYTES # BLD AUTO: 0 X10(3)/MCL (ref 0–0.04)
IMM GRANULOCYTES NFR BLD AUTO: 0 %
LYMPHOCYTES # BLD AUTO: 1.94 X10(3)/MCL (ref 0.6–4.6)
LYMPHOCYTES NFR BLD AUTO: 33.7 %
MCH RBC QN AUTO: 31.4 PG (ref 27–31)
MCHC RBC AUTO-ENTMCNC: 31.6 G/DL (ref 33–36)
MCV RBC AUTO: 99.5 FL (ref 80–94)
MONOCYTES # BLD AUTO: 0.52 X10(3)/MCL (ref 0.1–1.3)
MONOCYTES NFR BLD AUTO: 9 %
NEUTROPHILS # BLD AUTO: 2.96 X10(3)/MCL (ref 2.1–9.2)
NEUTROPHILS NFR BLD AUTO: 51.6 %
PLATELET # BLD AUTO: 186 X10(3)/MCL (ref 130–400)
PMV BLD AUTO: 10.2 FL (ref 7.4–10.4)
POTASSIUM SERPL-SCNC: 4.4 MMOL/L (ref 3.5–5.1)
PROT SERPL-MCNC: 6.9 GM/DL (ref 5.8–7.6)
RBC # BLD AUTO: 3.82 X10(6)/MCL (ref 4.2–5.4)
SODIUM SERPL-SCNC: 141 MMOL/L (ref 136–145)
VIT B12 SERPL-MCNC: 869 PG/ML (ref 213–816)
WBC # SPEC AUTO: 5.75 X10(3)/MCL (ref 4.5–11.5)

## 2023-11-02 PROCEDURE — 82306 VITAMIN D 25 HYDROXY: CPT

## 2023-11-02 PROCEDURE — 85025 COMPLETE CBC W/AUTO DIFF WBC: CPT

## 2023-11-02 PROCEDURE — 80053 COMPREHEN METABOLIC PANEL: CPT

## 2023-11-02 PROCEDURE — 86304 IMMUNOASSAY TUMOR CA 125: CPT

## 2023-11-02 PROCEDURE — 82607 VITAMIN B-12: CPT

## 2023-11-02 PROCEDURE — 36415 COLL VENOUS BLD VENIPUNCTURE: CPT

## 2023-11-08 NOTE — PROGRESS NOTES
Heme/Onc Progress Note    PATIENT: Kim Bah  MRN: [id number]  DATE: 11/9/2023  Chief Complaint: Follow-up (Patient is here for her 6 month visit)      Current Treatment:   Aromasin 2/25/21 6/27/21: Changed to letrozole due to cost    DENOSUMAB (PROLIA) Q6M     Oncology History   Ductal carcinoma in situ (DCIS) of right breast   6/9/2020 Initial Diagnosis    Treatment History:  6/9/20: right breast biopsy   Pathology: DCIS ER 96%NJ 87%    8/4/20:Genetic Testing: no clinical significant variant detected, no variants of unknown significance  8/7/2020: Lumpectomy right breast 1 o'clock position   Pathology: Ductal carcinoma in situ grade 2 with focal comedonecrosis, no invasive malignancy, involves the posterior margin and anterior superior medial margins,   ER 96%NJ 87%   2/1/21: Right simple mastectomy. DCIS, multifocal (X5) at the periphery of the previous lumpectomy site.  THE LARGEST FOCUS OF DCIS IS 6 MM.  SKIN / NIPPLE AND MARGINS CLEAR (> 1 CM.).  ONE SENTINEL LYMPH NODE, NEGATIVE FOR METASTATIC CARCINOMA.  AXILLARY CONTENTS:TWO LYMPH NODES, NEGATIVE FOR METASTATIC CARCINOMA.  ADDITIONAL SKIN:NEGATIVE FOR CARCINOMA.  pTis, pN0     2/25/2021 -  Hormone Therapy    Letrozole     Malignant neoplasm of endometrium   6/4/2020 Initial Diagnosis    Malignant neoplasm of endometrium     6/19/2020 Surgery    6/19/20: Total hysterectomy, bilateral salpingo-oophorectomy, hysterectomy lymphadenectomy, peritoneal washings, omental biopsy   Pathology: Poorly differentiated carcinoma with clear cell features, High-grade    Arises within an endometrial polyp, 5.1 cm    Omental biopsy negative    Lymph nodes negative    Peritoneal washings negative8/10/20: Completed vaginal brachytherapy      8/4/2020 Genetic Testing    8/4/20:Genetic Testing: no clinical significant variant detected, no variants of unknown significance     8/18/2020 - 12/2/2020 Chemotherapy    8/18/20-12/2/20: Completed 6 cycles of Carboplatin AUC  of 6 and Taxol 175mg/m2 q 3 weeks         11/09/2023  Patient presents for 6m follow up for DCIS. She is on letrozole and tolerating well. She has no complaints. She is very active, running marathons, plays pickle ball. She is also on Prolia. Her vitamin D level is elevated. Other labs are unremarkable.   She is up to date with her mammograms.   Past Medical History:   Diagnosis Date    Aneurysm of iliac artery     Cancer, uterine     DCIS (ductal carcinoma in situ) right breast     Deficiency of vitamin B12     Osteoporosis of lumbar spine     Port-A-Cath in place     removed    Pulmonary nodules         Current Outpatient Medications:     calcium carbonate (OS-SANDI) 600 mg calcium (1,500 mg) Tab, Take 600 mg by mouth once., Disp: , Rfl:     cyanocobalamin (DODEX) 1,000 mcg/mL injection, INJECT  1 ML  (1,000 MCG) INTO THE MUSCLE EVERY MONTH, Disp: 3 mL, Rfl: 0    denosumab (PROLIA) 60 mg/mL Syrg, Inject 60 mg into the skin., Disp: , Rfl:     letrozole (FEMARA) 2.5 mg Tab, TAKE 1 TABLET EVERY DAY, Disp: 90 tablet, Rfl: 0    vitamin D (VITAMIN D3) 1000 units Tab, Take 2,000 Units by mouth once daily., Disp: , Rfl:      Review of Systems:   Pertinent positives and negatives included in the HPI. Otherwise a complete review of   systems is negative.  Review of Systems   Constitutional:  Negative for appetite change and unexpected weight change.   HENT:  Negative for mouth sores.    Eyes:  Negative for visual disturbance.   Respiratory:  Negative for cough and shortness of breath.    Cardiovascular:  Negative for chest pain.   Gastrointestinal:  Negative for abdominal pain and diarrhea.   Genitourinary:  Negative for frequency.   Musculoskeletal:  Negative for back pain.   Skin:  Negative for rash.   Neurological:  Negative for headaches.   Hematological:  Negative for adenopathy.   Psychiatric/Behavioral:  The patient is not nervous/anxious.             Objective:     Vitals:    11/09/23 1049   BP: 135/62   Pulse: (!) 51    Temp: 97.4 °F (36.3 °C)           Physical Exam  Constitutional:       Appearance: Normal appearance.   HENT:      Head: Normocephalic and atraumatic.      Nose: Nose normal.      Mouth/Throat:      Mouth: Mucous membranes are moist.   Eyes:      Extraocular Movements: Extraocular movements intact.      Conjunctiva/sclera: Conjunctivae normal.      Pupils: Pupils are equal, round, and reactive to light.   Cardiovascular:      Rate and Rhythm: Normal rate and regular rhythm.      Pulses: Normal pulses.   Pulmonary:      Effort: Pulmonary effort is normal.      Breath sounds: Normal breath sounds.   Chest:      Comments: Right sided mastectomy. Left breast without palpable abnormalities, No skin or nipple changes. No axillary LAD  Abdominal:      General: Bowel sounds are normal.      Palpations: Abdomen is soft.   Musculoskeletal:      Cervical back: Normal range of motion and neck supple.   Neurological:      General: No focal deficit present.      Mental Status: She is alert and oriented to person, place, and time. Mental status is at baseline.   Psychiatric:         Mood and Affect: Mood normal.         Behavior: Behavior normal.           Lab Review:  CBC:   Recent Labs     11/02/23  1110   WBC 5.75   RBC 3.82*   HGB 12.0   HCT 38.0        CMP:   Recent Labs     11/02/23  1110      K 4.4   CO2 29   BUN 12.0   CREATININE 0.80   CALCIUM 9.5   ALBUMIN 4.4   BILITOT 1.2   ALKPHOS 48   AST 27   ALT 22     Iron:   Recent Labs     11/02/23  1110   [id number]*         Assessment and Plan   DCIS  Pulmonary Nodules  Osteoporosis  B12 deficiency  History of endometrial cancer-followed by Dr. Pearson, she saw him in 8/2023    PLAN:  Continue  femara until February of 2026  Prolia due 4/07/2024. Continue until 4/2025                  Repeat Bone density March of 2025   Continue B12 monthly  For pulmonary nodules, Repeat imaging CAP 5/2024   Screening mammogram in 8/2024  Hold Vitamin D with elevated level         Follow up in about 6 months (around 5/9/2024) for OV with Naima, labs prior (CBC, CMP, Vitamin D level and B12).

## 2023-11-09 ENCOUNTER — OFFICE VISIT (OUTPATIENT)
Dept: HEMATOLOGY/ONCOLOGY | Facility: CLINIC | Age: 73
End: 2023-11-09
Payer: MEDICARE

## 2023-11-09 VITALS
HEIGHT: 63 IN | OXYGEN SATURATION: 99 % | WEIGHT: 122.56 LBS | BODY MASS INDEX: 21.71 KG/M2 | TEMPERATURE: 97 F | DIASTOLIC BLOOD PRESSURE: 62 MMHG | HEART RATE: 51 BPM | SYSTOLIC BLOOD PRESSURE: 135 MMHG

## 2023-11-09 DIAGNOSIS — R79.89 HIGH SERUM VITAMIN D: ICD-10-CM

## 2023-11-09 DIAGNOSIS — Z12.31 ENCOUNTER FOR SCREENING MAMMOGRAM FOR MALIGNANT NEOPLASM OF BREAST: ICD-10-CM

## 2023-11-09 DIAGNOSIS — M81.0 AGE-RELATED OSTEOPOROSIS WITHOUT CURRENT PATHOLOGICAL FRACTURE: ICD-10-CM

## 2023-11-09 DIAGNOSIS — D05.11 DUCTAL CARCINOMA IN SITU (DCIS) OF RIGHT BREAST: Primary | ICD-10-CM

## 2023-11-09 DIAGNOSIS — E67.8: ICD-10-CM

## 2023-11-09 DIAGNOSIS — R91.8 MULTIPLE PULMONARY NODULES: ICD-10-CM

## 2023-11-09 DIAGNOSIS — Z85.42 HISTORY OF ENDOMETRIAL CANCER: ICD-10-CM

## 2023-11-09 DIAGNOSIS — E53.8 VITAMIN B12 DEFICIENCY: ICD-10-CM

## 2023-11-09 PROCEDURE — 99999 PR PBB SHADOW E&M-EST. PATIENT-LVL IV: ICD-10-PCS | Mod: PBBFAC,,, | Performed by: NURSE PRACTITIONER

## 2023-11-09 PROCEDURE — 99214 OFFICE O/P EST MOD 30 MIN: CPT | Mod: PBBFAC | Performed by: NURSE PRACTITIONER

## 2023-11-09 PROCEDURE — 99999 PR PBB SHADOW E&M-EST. PATIENT-LVL IV: CPT | Mod: PBBFAC,,, | Performed by: NURSE PRACTITIONER

## 2023-11-09 PROCEDURE — 99215 PR OFFICE/OUTPT VISIT, EST, LEVL V, 40-54 MIN: ICD-10-PCS | Mod: S$PBB,,, | Performed by: NURSE PRACTITIONER

## 2023-11-09 PROCEDURE — 99215 OFFICE O/P EST HI 40 MIN: CPT | Mod: S$PBB,,, | Performed by: NURSE PRACTITIONER

## 2024-02-28 DIAGNOSIS — D05.11 DUCTAL CARCINOMA IN SITU (DCIS) OF RIGHT BREAST: ICD-10-CM

## 2024-02-28 DIAGNOSIS — C54.1 MALIGNANT NEOPLASM OF ENDOMETRIUM: ICD-10-CM

## 2024-02-28 RX ORDER — LETROZOLE 2.5 MG/1
TABLET, FILM COATED ORAL
Qty: 90 TABLET | Refills: 0 | Status: SHIPPED | OUTPATIENT
Start: 2024-02-28 | End: 2024-05-29

## 2024-04-09 ENCOUNTER — INFUSION (OUTPATIENT)
Dept: INFUSION THERAPY | Facility: HOSPITAL | Age: 74
End: 2024-04-09
Attending: INTERNAL MEDICINE
Payer: MEDICARE

## 2024-04-09 VITALS
DIASTOLIC BLOOD PRESSURE: 61 MMHG | BODY MASS INDEX: 21.95 KG/M2 | HEART RATE: 60 BPM | HEIGHT: 63 IN | OXYGEN SATURATION: 96 % | SYSTOLIC BLOOD PRESSURE: 98 MMHG | RESPIRATION RATE: 18 BRPM | WEIGHT: 123.88 LBS | TEMPERATURE: 98 F

## 2024-04-09 DIAGNOSIS — M81.0 AGE-RELATED OSTEOPOROSIS WITHOUT CURRENT PATHOLOGICAL FRACTURE: Primary | ICD-10-CM

## 2024-04-09 PROCEDURE — 96372 THER/PROPH/DIAG INJ SC/IM: CPT

## 2024-04-09 PROCEDURE — 63600175 PHARM REV CODE 636 W HCPCS: Mod: JZ,JG | Performed by: INTERNAL MEDICINE

## 2024-04-09 RX ADMIN — DENOSUMAB 60 MG: 60 INJECTION SUBCUTANEOUS at 01:04

## 2024-04-09 NOTE — NURSING
Pt tolerated Prolia injection without any adverse events. Pt denied any recent dental work within the past 3 months as well as any upcoming invasive dental procedures. Pt discharged in stable condition, next appt given.

## 2024-05-08 DIAGNOSIS — D05.11 DUCTAL CARCINOMA IN SITU (DCIS) OF RIGHT BREAST: Primary | ICD-10-CM

## 2024-05-10 ENCOUNTER — LAB VISIT (OUTPATIENT)
Dept: LAB | Facility: HOSPITAL | Age: 74
End: 2024-05-10
Attending: NURSE PRACTITIONER
Payer: MEDICARE

## 2024-05-10 DIAGNOSIS — D05.11 DUCTAL CARCINOMA IN SITU (DCIS) OF RIGHT BREAST: ICD-10-CM

## 2024-05-10 DIAGNOSIS — E67.8: ICD-10-CM

## 2024-05-10 LAB
25(OH)D3+25(OH)D2 SERPL-MCNC: 48 NG/ML (ref 30–80)
ALBUMIN SERPL-MCNC: 4.3 G/DL (ref 3.4–4.8)
ALBUMIN/GLOB SERPL: 1.5 RATIO (ref 1.1–2)
ALP SERPL-CCNC: 53 UNIT/L (ref 40–150)
ALT SERPL-CCNC: 17 UNIT/L (ref 0–55)
AST SERPL-CCNC: 26 UNIT/L (ref 5–34)
BASOPHILS # BLD AUTO: 0.05 X10(3)/MCL
BASOPHILS NFR BLD AUTO: 0.9 %
BILIRUB SERPL-MCNC: 0.7 MG/DL
BUN SERPL-MCNC: 12.7 MG/DL (ref 9.8–20.1)
CALCIUM SERPL-MCNC: 9 MG/DL (ref 8.4–10.2)
CHLORIDE SERPL-SCNC: 100 MMOL/L (ref 98–107)
CO2 SERPL-SCNC: 29 MMOL/L (ref 23–31)
CREAT SERPL-MCNC: 0.75 MG/DL (ref 0.55–1.02)
EOSINOPHIL # BLD AUTO: 0.3 X10(3)/MCL (ref 0–0.9)
EOSINOPHIL NFR BLD AUTO: 5.2 %
ERYTHROCYTE [DISTWIDTH] IN BLOOD BY AUTOMATED COUNT: 12.4 % (ref 11.5–17)
GFR SERPLBLD CREATININE-BSD FMLA CKD-EPI: >60 ML/MIN/1.73/M2
GLOBULIN SER-MCNC: 2.8 GM/DL (ref 2.4–3.5)
GLUCOSE SERPL-MCNC: 98 MG/DL (ref 82–115)
HCT VFR BLD AUTO: 37.3 % (ref 37–47)
HGB BLD-MCNC: 12.5 G/DL (ref 12–16)
IMM GRANULOCYTES # BLD AUTO: 0.01 X10(3)/MCL (ref 0–0.04)
IMM GRANULOCYTES NFR BLD AUTO: 0.2 %
LYMPHOCYTES # BLD AUTO: 1.96 X10(3)/MCL (ref 0.6–4.6)
LYMPHOCYTES NFR BLD AUTO: 34.1 %
MCH RBC QN AUTO: 31.9 PG (ref 27–31)
MCHC RBC AUTO-ENTMCNC: 33.5 G/DL (ref 33–36)
MCV RBC AUTO: 95.2 FL (ref 80–94)
MONOCYTES # BLD AUTO: 0.53 X10(3)/MCL (ref 0.1–1.3)
MONOCYTES NFR BLD AUTO: 9.2 %
NEUTROPHILS # BLD AUTO: 2.9 X10(3)/MCL (ref 2.1–9.2)
NEUTROPHILS NFR BLD AUTO: 50.4 %
PLATELET # BLD AUTO: 204 X10(3)/MCL (ref 130–400)
PMV BLD AUTO: 10.1 FL (ref 7.4–10.4)
POTASSIUM SERPL-SCNC: 4 MMOL/L (ref 3.5–5.1)
PROT SERPL-MCNC: 7.1 GM/DL (ref 5.8–7.6)
RBC # BLD AUTO: 3.92 X10(6)/MCL (ref 4.2–5.4)
SODIUM SERPL-SCNC: 134 MMOL/L (ref 136–145)
VIT B12 SERPL-MCNC: >2000 PG/ML (ref 213–816)
WBC # SPEC AUTO: 5.75 X10(3)/MCL (ref 4.5–11.5)

## 2024-05-10 PROCEDURE — 82306 VITAMIN D 25 HYDROXY: CPT

## 2024-05-10 PROCEDURE — 80053 COMPREHEN METABOLIC PANEL: CPT

## 2024-05-10 PROCEDURE — 36415 COLL VENOUS BLD VENIPUNCTURE: CPT

## 2024-05-10 PROCEDURE — 82607 VITAMIN B-12: CPT

## 2024-05-10 PROCEDURE — 85025 COMPLETE CBC W/AUTO DIFF WBC: CPT

## 2024-05-11 DIAGNOSIS — E53.8 B12 DEFICIENCY: ICD-10-CM

## 2024-05-13 RX ORDER — CYANOCOBALAMIN 1000 UG/ML
INJECTION, SOLUTION INTRAMUSCULAR; SUBCUTANEOUS
Qty: 3 ML | Refills: 3 | Status: SHIPPED | OUTPATIENT
Start: 2024-05-13

## 2024-05-15 ENCOUNTER — OFFICE VISIT (OUTPATIENT)
Dept: HEMATOLOGY/ONCOLOGY | Facility: CLINIC | Age: 74
End: 2024-05-15
Payer: MEDICARE

## 2024-05-15 VITALS
HEIGHT: 63 IN | SYSTOLIC BLOOD PRESSURE: 124 MMHG | BODY MASS INDEX: 21.91 KG/M2 | TEMPERATURE: 98 F | DIASTOLIC BLOOD PRESSURE: 71 MMHG | WEIGHT: 123.69 LBS | HEART RATE: 59 BPM | OXYGEN SATURATION: 99 %

## 2024-05-15 DIAGNOSIS — D05.11 DUCTAL CARCINOMA IN SITU (DCIS) OF RIGHT BREAST: ICD-10-CM

## 2024-05-15 DIAGNOSIS — E53.8 VITAMIN B12 DEFICIENCY: ICD-10-CM

## 2024-05-15 DIAGNOSIS — Z85.42 HISTORY OF ENDOMETRIAL CANCER: ICD-10-CM

## 2024-05-15 DIAGNOSIS — R91.1 SOLITARY PULMONARY NODULE: Primary | ICD-10-CM

## 2024-05-15 PROCEDURE — 99999 PR PBB SHADOW E&M-EST. PATIENT-LVL III: CPT | Mod: PBBFAC,,, | Performed by: NURSE PRACTITIONER

## 2024-05-15 PROCEDURE — 99213 OFFICE O/P EST LOW 20 MIN: CPT | Mod: PBBFAC | Performed by: NURSE PRACTITIONER

## 2024-05-15 PROCEDURE — 99214 OFFICE O/P EST MOD 30 MIN: CPT | Mod: S$PBB,,, | Performed by: NURSE PRACTITIONER

## 2024-05-15 NOTE — PROGRESS NOTES
Heme/Onc Progress Note    PATIENT: Kim Bah  MRN: [id number]  DATE: 5/15/2024  Chief Complaint: Follow-up (Patient is here for her 6 month visit)      Current Treatment:   Aromasin 2/25/21 6/27/21: Changed to letrozole due to cost    DENOSUMAB (PROLIA) Q6M     Oncology History   Ductal carcinoma in situ (DCIS) of right breast   6/9/2020 Initial Diagnosis    Treatment History:  6/9/20: right breast biopsy   Pathology: DCIS ER 96%ME 87%    8/4/20:Genetic Testing: no clinical significant variant detected, no variants of unknown significance  8/7/2020: Lumpectomy right breast 1 o'clock position   Pathology: Ductal carcinoma in situ grade 2 with focal comedonecrosis, no invasive malignancy, involves the posterior margin and anterior superior medial margins,   ER 96%ME 87%   2/1/21: Right simple mastectomy. DCIS, multifocal (X5) at the periphery of the previous lumpectomy site.  THE LARGEST FOCUS OF DCIS IS 6 MM.  SKIN / NIPPLE AND MARGINS CLEAR (> 1 CM.).  ONE SENTINEL LYMPH NODE, NEGATIVE FOR METASTATIC CARCINOMA.  AXILLARY CONTENTS:TWO LYMPH NODES, NEGATIVE FOR METASTATIC CARCINOMA.  ADDITIONAL SKIN:NEGATIVE FOR CARCINOMA.  pTis, pN0     2/25/2021 -  Hormone Therapy    Letrozole     Malignant neoplasm of endometrium   6/4/2020 Initial Diagnosis    Malignant neoplasm of endometrium     6/19/2020 Surgery    6/19/20: Total hysterectomy, bilateral salpingo-oophorectomy, hysterectomy lymphadenectomy, peritoneal washings, omental biopsy   Pathology: Poorly differentiated carcinoma with clear cell features, High-grade    Arises within an endometrial polyp, 5.1 cm    Omental biopsy negative    Lymph nodes negative    Peritoneal washings negative8/10/20: Completed vaginal brachytherapy      8/4/2020 Genetic Testing    8/4/20:Genetic Testing: no clinical significant variant detected, no variants of unknown significance     8/18/2020 - 12/2/2020 Chemotherapy    8/18/20-12/2/20: Completed 6 cycles of Carboplatin AUC  of 6 and Taxol 175mg/m2 q 3 weeks         05/15/2024  Patient presents for 6m follow up for DCIS. She is on letrozole and tolerating well. She has no complaints. She is very active, running marathons, plays pickle ball. She is also on Prolia. Her vitamin D level is elevated. Other labs are unremarkable.   She is up to date with her mammograms.   Past Medical History:   Diagnosis Date    Aneurysm of iliac artery     Cancer, uterine     DCIS (ductal carcinoma in situ) right breast     Deficiency of vitamin B12     Osteoporosis of lumbar spine     Port-A-Cath in place     removed    Pulmonary nodules         Current Outpatient Medications:     cyanocobalamin 1,000 mcg/mL injection, INJECT 1 ML (1,000 MCG) INTO THE MUSCLE EVERY MONTH, Disp: 3 mL, Rfl: 3    denosumab (PROLIA) 60 mg/mL Syrg, Inject 60 mg into the skin., Disp: , Rfl:     letrozole (FEMARA) 2.5 mg Tab, TAKE 1 TABLET EVERY DAY, Disp: 90 tablet, Rfl: 0     Review of Systems:   Pertinent positives and negatives included in the HPI. Otherwise a complete review of   systems is negative.  Review of Systems   Constitutional:  Negative for appetite change and unexpected weight change.   HENT:  Negative for mouth sores.    Eyes:  Negative for visual disturbance.   Respiratory:  Negative for cough and shortness of breath.    Cardiovascular:  Negative for chest pain.   Gastrointestinal:  Negative for abdominal pain and diarrhea.   Genitourinary:  Negative for frequency.   Musculoskeletal:  Negative for back pain.   Skin:  Negative for rash.   Neurological:  Negative for headaches.   Hematological:  Negative for adenopathy.   Psychiatric/Behavioral:  The patient is not nervous/anxious.             Objective:     Vitals:    05/15/24 1153   BP: 124/71   Pulse: (!) 59   Temp: 98.2 °F (36.8 °C)             Physical Exam  Constitutional:       Appearance: Normal appearance.   HENT:      Head: Normocephalic and atraumatic.      Nose: Nose normal.      Mouth/Throat:       Mouth: Mucous membranes are moist.   Eyes:      Extraocular Movements: Extraocular movements intact.      Conjunctiva/sclera: Conjunctivae normal.      Pupils: Pupils are equal, round, and reactive to light.   Cardiovascular:      Rate and Rhythm: Normal rate and regular rhythm.      Pulses: Normal pulses.   Pulmonary:      Effort: Pulmonary effort is normal.      Breath sounds: Normal breath sounds.   Chest:      Comments: Right sided mastectomy. Left breast without palpable abnormalities, No skin or nipple changes. No axillary LAD  Abdominal:      General: Bowel sounds are normal.      Palpations: Abdomen is soft.   Musculoskeletal:      Cervical back: Normal range of motion and neck supple.   Neurological:      General: No focal deficit present.      Mental Status: She is alert and oriented to person, place, and time. Mental status is at baseline.   Psychiatric:         Mood and Affect: Mood normal.         Behavior: Behavior normal.           Lab Review:  CBC:   Recent Labs     05/10/24  1058   WBC 5.75   RBC 3.92*   HGB 12.5   HCT 37.3        CMP:   Recent Labs     05/10/24  1058   *   K 4.0   CO2 29   BUN 12.7   CREATININE 0.75   CALCIUM 9.0   ALBUMIN 4.3   BILITOT 0.7   ALKPHOS 53   AST 26   ALT 17     Iron:   Recent Labs     05/10/24  1058   [id number] >2,000*         Assessment and Plan   DCIS  Pulmonary Nodules  Osteoporosis  B12 deficiency  History of endometrial cancer-followed by Dr. Pearson, she saw him in 8/2023    PLAN:  Continue  femara until February of 2026  Prolia due 10/9/24. Continue until 4/2025                  Repeat Bone density March of 2025   Continue B12 monthly  For pulmonary nodules, Repeat imaging CAP 5/2024   Screening mammogram in 8/2024  Hold Vitamin D with elevated level        Follow up in about 3 weeks (around 6/5/2024) for T/M visit [facility] Dr. Harper to review imaging.

## 2024-05-29 DIAGNOSIS — C54.1 MALIGNANT NEOPLASM OF ENDOMETRIUM: ICD-10-CM

## 2024-05-29 DIAGNOSIS — D05.11 DUCTAL CARCINOMA IN SITU (DCIS) OF RIGHT BREAST: ICD-10-CM

## 2024-05-29 RX ORDER — LETROZOLE 2.5 MG/1
TABLET, FILM COATED ORAL
Qty: 90 TABLET | Refills: 3 | Status: SHIPPED | OUTPATIENT
Start: 2024-05-29

## 2024-05-31 NOTE — PROGRESS NOTES
Heme/Onc Progress Note    PATIENT: Kim Bah  MRN: [id number]  DATE: 6/6/2024  Chief Complaint: Follow-up (Telephone visit)      Current Treatment:   Aromasin 2/25/21 6/27/21: Changed to letrozole due to cost    DENOSUMAB (PROLIA) Q6M     Oncology History   Ductal carcinoma in situ (DCIS) of right breast   6/9/2020 Initial Diagnosis    Treatment History:  6/9/20: right breast biopsy   Pathology: DCIS ER 96%IA 87%    8/4/20:Genetic Testing: no clinical significant variant detected, no variants of unknown significance  8/7/2020: Lumpectomy right breast 1 o'clock position   Pathology: Ductal carcinoma in situ grade 2 with focal comedonecrosis, no invasive malignancy, involves the posterior margin and anterior superior medial margins,   ER 96%IA 87%   2/1/21: Right simple mastectomy. DCIS, multifocal (X5) at the periphery of the previous lumpectomy site.  THE LARGEST FOCUS OF DCIS IS 6 MM.  SKIN / NIPPLE AND MARGINS CLEAR (> 1 CM.).  ONE SENTINEL LYMPH NODE, NEGATIVE FOR METASTATIC CARCINOMA.  AXILLARY CONTENTS:TWO LYMPH NODES, NEGATIVE FOR METASTATIC CARCINOMA.  ADDITIONAL SKIN:NEGATIVE FOR CARCINOMA.  pTis, pN0     2/25/2021 -  Hormone Therapy    Letrozole     Malignant neoplasm of endometrium   6/4/2020 Initial Diagnosis    Malignant neoplasm of endometrium     6/19/2020 Surgery    6/19/20: Total hysterectomy, bilateral salpingo-oophorectomy, hysterectomy lymphadenectomy, peritoneal washings, omental biopsy   Pathology: Poorly differentiated carcinoma with clear cell features, High-grade    Arises within an endometrial polyp, 5.1 cm    Omental biopsy negative    Lymph nodes negative    Peritoneal washings negative8/10/20: Completed vaginal brachytherapy      8/4/2020 Genetic Testing    8/4/20:Genetic Testing: no clinical significant variant detected, no variants of unknown significance     8/18/2020 - 12/2/2020 Chemotherapy    8/18/20-12/2/20: Completed 6 cycles of Carboplatin AUC of 6 and Taxol 175mg/m2  q 3 weeks         06/06/2024  Patient presents for 6m follow up for DCIS. She is on letrozole and tolerating well.     CT Chest 6/4/24  1. No evidence of metastatic disease in the chest.  2. Ectasia of the ascending thoracic aorta.    8/12/24 mammogram  10/9/24 Prolia    Past Medical History:   Diagnosis Date    Aneurysm of iliac artery     Cancer, uterine     DCIS (ductal carcinoma in situ) right breast     Deficiency of vitamin B12     Osteoporosis of lumbar spine     Port-A-Cath in place     removed    Pulmonary nodules         Current Outpatient Medications:     cyanocobalamin 1,000 mcg/mL injection, INJECT 1 ML (1,000 MCG) INTO THE MUSCLE EVERY MONTH, Disp: 3 mL, Rfl: 3    denosumab (PROLIA) 60 mg/mL Syrg, Inject 60 mg into the skin., Disp: , Rfl:     letrozole (FEMARA) 2.5 mg Tab, TAKE 1 TABLET EVERY DAY, Disp: 90 tablet, Rfl: 3     Review of Systems:   Pertinent positives and negatives included in the HPI. Otherwise a complete review of   systems is negative.  Review of Systems   Constitutional:  Negative for appetite change and unexpected weight change.   HENT:  Negative for mouth sores.    Eyes:  Negative for visual disturbance.   Respiratory:  Negative for cough and shortness of breath.    Cardiovascular:  Negative for chest pain.   Gastrointestinal:  Negative for abdominal pain and diarrhea.   Genitourinary:  Negative for frequency.   Musculoskeletal:  Negative for back pain.   Skin:  Negative for rash.   Neurological:  Negative for headaches.   Hematological:  Negative for adenopathy.   Psychiatric/Behavioral:  The patient is not nervous/anxious.             Objective:     There were no vitals filed for this visit.       Physical Exam  Pulmonary:      Effort: Pulmonary effort is normal.   Neurological:      General: No focal deficit present.      Mental Status: She is alert.           Lab Review:  CBC:   Recent Labs     05/10/24  1058   WBC 5.75   RBC 3.92*   HGB 12.5   HCT 37.3        CMP:    Recent Labs     05/10/24  1058   *   K 4.0      CO2 29   BUN 12.7   CREATININE 0.75   CALCIUM 9.0   ALBUMIN 4.3   BILITOT 0.7   ALKPHOS 53   AST 26   ALT 17     Iron:   Recent Labs     05/10/24  1058   [id number] >2,000*       Assessment and Plan   DCIS  Pulmonary Nodules---stable since October of 2021  Osteoporosis  B12 deficiency  History of endometrial cancer-followed by Dr. Pearson, she saw him in 5/2024  Ectasia of the aorta    PLAN:  Continue  femara until February of 2026  Prolia due 10/9/24. Continue until 4/2025                  Repeat Bone density March of 2025   Continue B12 monthly  For pulmonary nodules, Repeat imaging CAP 5/2025  Screening mammogram in 8/2024  Hold Vitamin D with elevated level   She was having questions regarding the CT in the ectasia of the thoracic aorta.  She has a history of vessel aneurysms followed by Dr. Sharma.  She wants further clarification from him as to this process       Follow up in about 5 months (around 11/20/2024).      Telemed: This visit was a telemedicine/tt.  Real-time audio and video were used following [facility] protocols.  The patient and/or family agreed to the security of information at the location.  I was located in the [facility] with this visit occurred.  The patient was located.  At home.  I am licensed to practice in the [facility].  Total time in reviewing records from gyn Oncology, labs, CT imaging, discussion with the patient 30 minutes

## 2024-06-03 ENCOUNTER — PATIENT MESSAGE (OUTPATIENT)
Dept: HEMATOLOGY/ONCOLOGY | Facility: CLINIC | Age: 74
End: 2024-06-03
Payer: MEDICARE

## 2024-06-04 ENCOUNTER — HOSPITAL ENCOUNTER (OUTPATIENT)
Dept: RADIOLOGY | Facility: HOSPITAL | Age: 74
Discharge: HOME | End: 2024-06-04
Attending: NURSE PRACTITIONER
Payer: MEDICARE

## 2024-06-04 DIAGNOSIS — R91.1 SOLITARY PULMONARY NODULE: ICD-10-CM

## 2024-06-04 PROCEDURE — 25500020 PHARM REV CODE 255: Performed by: NURSE PRACTITIONER

## 2024-06-04 PROCEDURE — 71260 CT THORAX DX C+: CPT | Mod: TC

## 2024-06-04 RX ADMIN — IOHEXOL 75 ML: 350 INJECTION, SOLUTION INTRAVENOUS at 01:06

## 2024-06-05 PROBLEM — R91.1 SOLITARY PULMONARY NODULE: Status: ACTIVE | Noted: 2024-06-05

## 2024-06-06 ENCOUNTER — OFFICE VISIT (OUTPATIENT)
Dept: HEMATOLOGY/ONCOLOGY | Facility: CLINIC | Age: 74
End: 2024-06-06
Payer: MEDICARE

## 2024-06-06 DIAGNOSIS — R91.1 SOLITARY PULMONARY NODULE: ICD-10-CM

## 2024-06-06 DIAGNOSIS — Z85.42 HISTORY OF ENDOMETRIAL CANCER: Primary | ICD-10-CM

## 2024-06-06 DIAGNOSIS — D05.11 DUCTAL CARCINOMA IN SITU (DCIS) OF RIGHT BREAST: ICD-10-CM

## 2024-06-06 DIAGNOSIS — E53.8 VITAMIN B12 DEFICIENCY: ICD-10-CM

## 2024-06-06 DIAGNOSIS — I77.810 THORACIC AORTIC ECTASIA: ICD-10-CM

## 2024-06-06 PROCEDURE — 99214 OFFICE O/P EST MOD 30 MIN: CPT | Mod: 95,,, | Performed by: INTERNAL MEDICINE

## 2024-08-12 ENCOUNTER — HOSPITAL ENCOUNTER (OUTPATIENT)
Dept: RADIOLOGY | Facility: HOSPITAL | Age: 74
Discharge: HOME | End: 2024-08-12
Attending: NURSE PRACTITIONER
Payer: MEDICARE

## 2024-08-12 DIAGNOSIS — Z12.31 ENCOUNTER FOR SCREENING MAMMOGRAM FOR MALIGNANT NEOPLASM OF BREAST: ICD-10-CM

## 2024-08-12 DIAGNOSIS — D05.11 DUCTAL CARCINOMA IN SITU (DCIS) OF RIGHT BREAST: ICD-10-CM

## 2024-08-12 PROCEDURE — 77067 SCR MAMMO BI INCL CAD: CPT | Mod: 26,52,, | Performed by: STUDENT IN AN ORGANIZED HEALTH CARE EDUCATION/TRAINING PROGRAM

## 2024-08-12 PROCEDURE — 77067 SCR MAMMO BI INCL CAD: CPT | Mod: TC,52

## 2024-08-12 PROCEDURE — 77063 BREAST TOMOSYNTHESIS BI: CPT | Mod: 26,52,, | Performed by: STUDENT IN AN ORGANIZED HEALTH CARE EDUCATION/TRAINING PROGRAM

## 2024-10-09 ENCOUNTER — INFUSION (OUTPATIENT)
Dept: INFUSION THERAPY | Facility: HOSPITAL | Age: 74
End: 2024-10-09
Attending: INTERNAL MEDICINE
Payer: MEDICARE

## 2024-10-09 VITALS — RESPIRATION RATE: 16 BRPM | HEART RATE: 66 BPM | DIASTOLIC BLOOD PRESSURE: 66 MMHG | SYSTOLIC BLOOD PRESSURE: 110 MMHG

## 2024-10-09 DIAGNOSIS — M81.0 AGE-RELATED OSTEOPOROSIS WITHOUT CURRENT PATHOLOGICAL FRACTURE: Primary | ICD-10-CM

## 2024-10-09 PROCEDURE — 63600175 PHARM REV CODE 636 W HCPCS: Mod: JZ,JG | Performed by: NURSE PRACTITIONER

## 2024-10-09 PROCEDURE — 96372 THER/PROPH/DIAG INJ SC/IM: CPT

## 2024-10-09 RX ADMIN — DENOSUMAB 60 MG: 60 INJECTION SUBCUTANEOUS at 01:10

## 2024-11-11 ENCOUNTER — PATIENT MESSAGE (OUTPATIENT)
Dept: HEMATOLOGY/ONCOLOGY | Facility: CLINIC | Age: 74
End: 2024-11-11
Payer: MEDICARE

## 2024-11-12 ENCOUNTER — OFFICE VISIT (OUTPATIENT)
Dept: HEMATOLOGY/ONCOLOGY | Facility: CLINIC | Age: 74
End: 2024-11-12
Payer: MEDICARE

## 2024-11-12 VITALS
RESPIRATION RATE: 17 BRPM | WEIGHT: 123.69 LBS | DIASTOLIC BLOOD PRESSURE: 67 MMHG | SYSTOLIC BLOOD PRESSURE: 128 MMHG | OXYGEN SATURATION: 98 % | HEIGHT: 63 IN | HEART RATE: 51 BPM | BODY MASS INDEX: 21.91 KG/M2 | TEMPERATURE: 98 F

## 2024-11-12 DIAGNOSIS — R91.1 SOLITARY PULMONARY NODULE: ICD-10-CM

## 2024-11-12 DIAGNOSIS — Z12.31 VISIT FOR SCREENING MAMMOGRAM: ICD-10-CM

## 2024-11-12 DIAGNOSIS — Z85.42 HISTORY OF ENDOMETRIAL CANCER: Primary | ICD-10-CM

## 2024-11-12 DIAGNOSIS — M81.0 AGE-RELATED OSTEOPOROSIS WITHOUT CURRENT PATHOLOGICAL FRACTURE: ICD-10-CM

## 2024-11-12 DIAGNOSIS — E53.8 VITAMIN B12 DEFICIENCY: ICD-10-CM

## 2024-11-12 DIAGNOSIS — Z91.89 AT HIGH RISK FOR BREAST CANCER: ICD-10-CM

## 2024-11-12 DIAGNOSIS — E53.8 B12 DEFICIENCY: ICD-10-CM

## 2024-11-12 DIAGNOSIS — D05.11 DUCTAL CARCINOMA IN SITU (DCIS) OF RIGHT BREAST: ICD-10-CM

## 2024-11-12 PROCEDURE — 99999 PR PBB SHADOW E&M-EST. PATIENT-LVL IV: CPT | Mod: PBBFAC,,, | Performed by: NURSE PRACTITIONER

## 2024-11-12 PROCEDURE — 99214 OFFICE O/P EST MOD 30 MIN: CPT | Mod: S$PBB,,, | Performed by: NURSE PRACTITIONER

## 2024-11-12 PROCEDURE — 99214 OFFICE O/P EST MOD 30 MIN: CPT | Mod: PBBFAC | Performed by: NURSE PRACTITIONER

## 2024-11-12 RX ORDER — CYANOCOBALAMIN 1000 UG/ML
1000 INJECTION, SOLUTION INTRAMUSCULAR; SUBCUTANEOUS
Qty: 3 ML | Refills: 3 | Status: SHIPPED | OUTPATIENT
Start: 2024-11-12

## 2024-11-12 NOTE — PROGRESS NOTES
Heme/Onc Progress Note    PATIENT: Kim Bah  MRN: [id number]  DATE: 11/12/2024  Chief Complaint: 5m f/u      Current Treatment:   Aromasin 2/25/21 6/27/21: Changed to letrozole due to cost    DENOSUMAB (PROLIA) Q6M     Oncology History   Ductal carcinoma in situ (DCIS) of right breast   6/9/2020 Initial Diagnosis    Treatment History:  6/9/20: right breast biopsy   Pathology: DCIS ER 96%TN 87%    8/4/20:Genetic Testing: no clinical significant variant detected, no variants of unknown significance  8/7/2020: Lumpectomy right breast 1 o'clock position   Pathology: Ductal carcinoma in situ grade 2 with focal comedonecrosis, no invasive malignancy, involves the posterior margin and anterior superior medial margins,   ER 96%TN 87%   2/1/21: Right simple mastectomy. DCIS, multifocal (X5) at the periphery of the previous lumpectomy site.  THE LARGEST FOCUS OF DCIS IS 6 MM.  SKIN / NIPPLE AND MARGINS CLEAR (> 1 CM.).  ONE SENTINEL LYMPH NODE, NEGATIVE FOR METASTATIC CARCINOMA.  AXILLARY CONTENTS:TWO LYMPH NODES, NEGATIVE FOR METASTATIC CARCINOMA.  ADDITIONAL SKIN:NEGATIVE FOR CARCINOMA.  pTis, pN0     2/25/2021 -  Hormone Therapy    Letrozole     Malignant neoplasm of endometrium   6/4/2020 Initial Diagnosis    Malignant neoplasm of endometrium     6/19/2020 Surgery    6/19/20: Total hysterectomy, bilateral salpingo-oophorectomy, hysterectomy lymphadenectomy, peritoneal washings, omental biopsy   Pathology: Poorly differentiated carcinoma with clear cell features, High-grade    Arises within an endometrial polyp, 5.1 cm    Omental biopsy negative    Lymph nodes negative    Peritoneal washings negative8/10/20: Completed vaginal brachytherapy      8/4/2020 Genetic Testing    8/4/20:Genetic Testing: no clinical significant variant detected, no variants of unknown significance     8/18/2020 - 12/2/2020 Chemotherapy    8/18/20-12/2/20: Completed 6 cycles of Carboplatin AUC of 6 and Taxol 175mg/m2 q 3 weeks          11/12/2024  Patient presents for 6m follow up for DCIS. She is on letrozole and tolerating well. She has no complaints. She stays active with tennis. She follows Dr. Pearson for endometrial carcinoma. Recently seen him in May 2024.     Past Medical History:   Diagnosis Date    Aneurysm of iliac artery     Cancer, uterine     DCIS (ductal carcinoma in situ) right breast     Deficiency of vitamin B12     Osteoporosis of lumbar spine     Port-A-Cath in place     removed    Pulmonary nodules         Current Outpatient Medications:     denosumab (PROLIA) 60 mg/mL Syrg, Inject 60 mg into the skin., Disp: , Rfl:     letrozole (FEMARA) 2.5 mg Tab, TAKE 1 TABLET EVERY DAY, Disp: 90 tablet, Rfl: 3    cyanocobalamin 1,000 mcg/mL injection, Inject 1 mL (1,000 mcg total) into the muscle every 30 days., Disp: 3 mL, Rfl: 3     Review of Systems:   Pertinent positives and negatives included in the HPI. Otherwise a complete review of   systems is negative.  Review of Systems   Constitutional:  Negative for appetite change and unexpected weight change.   HENT:  Negative for mouth sores.    Eyes:  Negative for visual disturbance.   Respiratory:  Negative for cough and shortness of breath.    Cardiovascular:  Negative for chest pain.   Gastrointestinal:  Negative for abdominal pain and diarrhea.   Genitourinary:  Negative for frequency.   Musculoskeletal:  Negative for back pain.   Skin:  Negative for rash.   Neurological:  Negative for headaches.   Hematological:  Negative for adenopathy.   Psychiatric/Behavioral:  The patient is not nervous/anxious.             Objective:     Vitals:    11/12/24 1045   BP: 128/67   Pulse: (!) 51   Resp: 17   Temp: 97.8 °F (36.6 °C)          Physical Exam  Constitutional:       Appearance: Normal appearance.   HENT:      Head: Normocephalic and atraumatic.      Nose: Nose normal.      Mouth/Throat:      Mouth: Mucous membranes are moist.   Eyes:      Extraocular Movements: Extraocular movements  intact.      Conjunctiva/sclera: Conjunctivae normal.      Pupils: Pupils are equal, round, and reactive to light.   Cardiovascular:      Rate and Rhythm: Normal rate and regular rhythm.      Pulses: Normal pulses.   Pulmonary:      Effort: Pulmonary effort is normal.      Breath sounds: Normal breath sounds.   Chest:      Comments: Right mastectomy, left breast with no masses, no skin or nipple changes, No lad  Abdominal:      General: Bowel sounds are normal.      Palpations: Abdomen is soft.   Musculoskeletal:      Cervical back: Normal range of motion and neck supple.   Neurological:      General: No focal deficit present.      Mental Status: She is alert and oriented to person, place, and time. Mental status is at baseline.   Psychiatric:         Mood and Affect: Mood normal.         Behavior: Behavior normal.             Assessment and Plan   DCIS  Pulmonary Nodules---stable since October of 2021  Osteoporosis  B12 deficiency  History of endometrial cancer-followed by Dr. Pearson, she saw him in 5/2024  Ectasia of the aorta    PLAN:  Continue  femara until February of 2026  Prolia q6m.  Continue until 4/2025                  Repeat Bone density March of 2025   Continue B12 monthly  For pulmonary nodules, Repeat imaging CAP 5/2025  Screening mammogram in 8/2025, alternate with MRI. MRI in February  Hold Vitamin D with elevated level   She was having questions regarding the CT in the ectasia of the thoracic aorta.  She has a history of vessel aneurysms followed by Dr. Sharma.  She wants further clarification from him as to this process       Follow up in about 6 months (around 5/12/2025) for OV, labs with PCP. Prolia due 4/2025.

## 2025-03-01 DIAGNOSIS — E53.8 B12 DEFICIENCY: ICD-10-CM

## 2025-03-02 RX ORDER — CYANOCOBALAMIN 1000 UG/ML
INJECTION, SOLUTION INTRAMUSCULAR; SUBCUTANEOUS
Qty: 3 ML | Refills: 3 | Status: SHIPPED | OUTPATIENT
Start: 2025-03-02

## 2025-03-17 ENCOUNTER — RESULTS FOLLOW-UP (OUTPATIENT)
Dept: HEMATOLOGY/ONCOLOGY | Facility: CLINIC | Age: 75
End: 2025-03-17
Payer: MEDICARE

## 2025-04-21 ENCOUNTER — LAB VISIT (OUTPATIENT)
Dept: LAB | Facility: HOSPITAL | Age: 75
End: 2025-04-21
Attending: INTERNAL MEDICINE
Payer: MEDICARE

## 2025-04-21 ENCOUNTER — INFUSION (OUTPATIENT)
Dept: INFUSION THERAPY | Facility: HOSPITAL | Age: 75
End: 2025-04-21
Attending: INTERNAL MEDICINE
Payer: MEDICARE

## 2025-04-21 DIAGNOSIS — M81.0 AGE-RELATED OSTEOPOROSIS WITHOUT CURRENT PATHOLOGICAL FRACTURE: Primary | ICD-10-CM

## 2025-04-21 DIAGNOSIS — D05.11 DUCTAL CARCINOMA IN SITU (DCIS) OF RIGHT BREAST: ICD-10-CM

## 2025-04-21 LAB
ALBUMIN SERPL-MCNC: 4.1 G/DL (ref 3.4–4.8)
ALBUMIN/GLOB SERPL: 1.3 RATIO (ref 1.1–2)
ALP SERPL-CCNC: 49 UNIT/L (ref 40–150)
ALT SERPL-CCNC: 12 UNIT/L (ref 0–55)
ANION GAP SERPL CALC-SCNC: 7 MEQ/L
AST SERPL-CCNC: 21 UNIT/L (ref 11–45)
BASOPHILS # BLD AUTO: 0.06 X10(3)/MCL
BASOPHILS NFR BLD AUTO: 1 %
BILIRUB SERPL-MCNC: 0.7 MG/DL
BUN SERPL-MCNC: 11.7 MG/DL (ref 9.8–20.1)
CALCIUM SERPL-MCNC: 9.2 MG/DL (ref 8.4–10.2)
CHLORIDE SERPL-SCNC: 104 MMOL/L (ref 98–107)
CO2 SERPL-SCNC: 28 MMOL/L (ref 23–31)
CREAT SERPL-MCNC: 0.74 MG/DL (ref 0.55–1.02)
CREAT/UREA NIT SERPL: 16
EOSINOPHIL # BLD AUTO: 0.23 X10(3)/MCL (ref 0–0.9)
EOSINOPHIL NFR BLD AUTO: 3.9 %
ERYTHROCYTE [DISTWIDTH] IN BLOOD BY AUTOMATED COUNT: 12.4 % (ref 11.5–17)
GFR SERPLBLD CREATININE-BSD FMLA CKD-EPI: >60 ML/MIN/1.73/M2
GLOBULIN SER-MCNC: 3.1 GM/DL (ref 2.4–3.5)
GLUCOSE SERPL-MCNC: 114 MG/DL (ref 82–115)
HCT VFR BLD AUTO: 36.4 % (ref 37–47)
HGB BLD-MCNC: 12.4 G/DL (ref 12–16)
IMM GRANULOCYTES # BLD AUTO: 0 X10(3)/MCL (ref 0–0.04)
IMM GRANULOCYTES NFR BLD AUTO: 0 %
LYMPHOCYTES # BLD AUTO: 1.69 X10(3)/MCL (ref 0.6–4.6)
LYMPHOCYTES NFR BLD AUTO: 28.7 %
MCH RBC QN AUTO: 32.9 PG (ref 27–31)
MCHC RBC AUTO-ENTMCNC: 34.1 G/DL (ref 33–36)
MCV RBC AUTO: 96.6 FL (ref 80–94)
MONOCYTES # BLD AUTO: 0.56 X10(3)/MCL (ref 0.1–1.3)
MONOCYTES NFR BLD AUTO: 9.5 %
NEUTROPHILS # BLD AUTO: 3.34 X10(3)/MCL (ref 2.1–9.2)
NEUTROPHILS NFR BLD AUTO: 56.9 %
PLATELET # BLD AUTO: 227 X10(3)/MCL (ref 130–400)
PMV BLD AUTO: 10.1 FL (ref 7.4–10.4)
POTASSIUM SERPL-SCNC: 4 MMOL/L (ref 3.5–5.1)
PROT SERPL-MCNC: 7.2 GM/DL (ref 5.8–7.6)
RBC # BLD AUTO: 3.77 X10(6)/MCL (ref 4.2–5.4)
SODIUM SERPL-SCNC: 139 MMOL/L (ref 136–145)
WBC # BLD AUTO: 5.88 X10(3)/MCL (ref 4.5–11.5)

## 2025-04-21 PROCEDURE — 36415 COLL VENOUS BLD VENIPUNCTURE: CPT

## 2025-04-21 PROCEDURE — 80053 COMPREHEN METABOLIC PANEL: CPT

## 2025-04-21 PROCEDURE — 85025 COMPLETE CBC W/AUTO DIFF WBC: CPT

## 2025-04-21 PROCEDURE — 63600175 PHARM REV CODE 636 W HCPCS: Mod: JZ,TB | Performed by: NURSE PRACTITIONER

## 2025-04-21 PROCEDURE — 96372 THER/PROPH/DIAG INJ SC/IM: CPT

## 2025-04-21 RX ADMIN — DENOSUMAB 60 MG: 60 INJECTION SUBCUTANEOUS at 02:04

## 2025-05-28 ENCOUNTER — OFFICE VISIT (OUTPATIENT)
Dept: HEMATOLOGY/ONCOLOGY | Facility: CLINIC | Age: 75
End: 2025-05-28
Payer: MEDICARE

## 2025-05-28 VITALS
HEART RATE: 60 BPM | TEMPERATURE: 98 F | BODY MASS INDEX: 22.15 KG/M2 | RESPIRATION RATE: 18 BRPM | WEIGHT: 125 LBS | HEIGHT: 63 IN | SYSTOLIC BLOOD PRESSURE: 113 MMHG | OXYGEN SATURATION: 99 % | DIASTOLIC BLOOD PRESSURE: 71 MMHG

## 2025-05-28 DIAGNOSIS — Z85.42 HISTORY OF ENDOMETRIAL CANCER: ICD-10-CM

## 2025-05-28 DIAGNOSIS — E53.8 B12 DEFICIENCY: ICD-10-CM

## 2025-05-28 DIAGNOSIS — M81.0 AGE-RELATED OSTEOPOROSIS WITHOUT CURRENT PATHOLOGICAL FRACTURE: ICD-10-CM

## 2025-05-28 DIAGNOSIS — D05.11 DUCTAL CARCINOMA IN SITU (DCIS) OF RIGHT BREAST: Primary | ICD-10-CM

## 2025-05-28 PROCEDURE — 99214 OFFICE O/P EST MOD 30 MIN: CPT | Mod: S$PBB,,, | Performed by: NURSE PRACTITIONER

## 2025-05-28 PROCEDURE — 99213 OFFICE O/P EST LOW 20 MIN: CPT | Mod: PBBFAC | Performed by: NURSE PRACTITIONER

## 2025-05-28 PROCEDURE — 99999 PR PBB SHADOW E&M-EST. PATIENT-LVL III: CPT | Mod: PBBFAC,,, | Performed by: NURSE PRACTITIONER

## 2025-05-28 NOTE — PROGRESS NOTES
Heme/Onc Progress Note    PATIENT: Kim Bah  MRN: [id number]  DATE: 5/28/2025  Chief Complaint: 6m f/u      Current Treatment:   Aromasin 2/25/21 6/27/21: Changed to letrozole due to cost    DENOSUMAB (PROLIA) Q6M     Oncology History   Ductal carcinoma in situ (DCIS) of right breast   6/9/2020 Initial Diagnosis    Treatment History:  6/9/20: right breast biopsy   Pathology: DCIS ER 96%HI 87%    8/4/20:Genetic Testing: no clinical significant variant detected, no variants of unknown significance  8/7/2020: Lumpectomy right breast 1 o'clock position   Pathology: Ductal carcinoma in situ grade 2 with focal comedonecrosis, no invasive malignancy, involves the posterior margin and anterior superior medial margins,   ER 96%HI 87%   2/1/21: Right simple mastectomy. DCIS, multifocal (X5) at the periphery of the previous lumpectomy site.  THE LARGEST FOCUS OF DCIS IS 6 MM.  SKIN / NIPPLE AND MARGINS CLEAR (> 1 CM.).  ONE SENTINEL LYMPH NODE, NEGATIVE FOR METASTATIC CARCINOMA.  AXILLARY CONTENTS:TWO LYMPH NODES, NEGATIVE FOR METASTATIC CARCINOMA.  ADDITIONAL SKIN:NEGATIVE FOR CARCINOMA.  pTis, pN0     2/25/2021 -  Hormone Therapy    Letrozole     Malignant neoplasm of endometrium   6/4/2020 Initial Diagnosis    Malignant neoplasm of endometrium     6/19/2020 Surgery    6/19/20: Total hysterectomy, bilateral salpingo-oophorectomy, hysterectomy lymphadenectomy, peritoneal washings, omental biopsy   Pathology: Poorly differentiated carcinoma with clear cell features, High-grade    Arises within an endometrial polyp, 5.1 cm    Omental biopsy negative    Lymph nodes negative    Peritoneal washings negative8/10/20: Completed vaginal brachytherapy      8/4/2020 Genetic Testing    8/4/20:Genetic Testing: no clinical significant variant detected, no variants of unknown significance     8/18/2020 - 12/2/2020 Chemotherapy    8/18/20-12/2/20: Completed 6 cycles of Carboplatin AUC of 6 and Taxol 175mg/m2 q 3 weeks          05/28/2025  Patient presents for 6m follow up for DCIS. She is on letrozole and tolerating well, however her last bone density shows significant interval decrease in bone density since a DXA exam 03/13/2023 despite prolia.  She has no complaints.   Past Medical History:   Diagnosis Date    Aneurysm of iliac artery     Cancer, uterine     DCIS (ductal carcinoma in situ) right breast     Deficiency of vitamin B12     Osteoporosis of lumbar spine     Port-A-Cath in place     removed    Pulmonary nodules         Current Outpatient Medications:     cyanocobalamin 1,000 mcg/mL injection, INJECT 1 ML (1,000 MCG) INTO THE MUSCLE EVERY MONTH, Disp: 3 mL, Rfl: 3    denosumab (PROLIA) 60 mg/mL Syrg, Inject 60 mg into the skin., Disp: , Rfl:     letrozole (FEMARA) 2.5 mg Tab, TAKE 1 TABLET EVERY DAY, Disp: 90 tablet, Rfl: 3     Review of Systems:   Pertinent positives and negatives included in the HPI. Otherwise a complete review of   systems is negative.  Review of Systems   Constitutional:  Negative for appetite change and unexpected weight change.   HENT:  Negative for mouth sores.    Eyes:  Negative for visual disturbance.   Respiratory:  Negative for cough and shortness of breath.    Cardiovascular:  Negative for chest pain.   Gastrointestinal:  Negative for abdominal pain and diarrhea.   Genitourinary:  Negative for frequency.   Musculoskeletal:  Negative for back pain.   Skin:  Negative for rash.   Neurological:  Negative for headaches.   Hematological:  Negative for adenopathy.   Psychiatric/Behavioral:  The patient is not nervous/anxious.             Objective:     Vitals:    05/28/25 1428   BP: 113/71   Pulse: 60   Resp: 18   Temp: 97.6 °F (36.4 °C)            Physical Exam  Constitutional:       Appearance: Normal appearance.   HENT:      Head: Normocephalic and atraumatic.      Nose: Nose normal.      Mouth/Throat:      Mouth: Mucous membranes are moist.   Eyes:      Extraocular Movements: Extraocular movements  intact.      Conjunctiva/sclera: Conjunctivae normal.      Pupils: Pupils are equal, round, and reactive to light.   Cardiovascular:      Rate and Rhythm: Normal rate and regular rhythm.      Pulses: Normal pulses.   Pulmonary:      Effort: Pulmonary effort is normal.      Breath sounds: Normal breath sounds.   Chest:      Comments: Right mastectomy, left breast with no masses, no skin or nipple changes, No lad  Abdominal:      General: Bowel sounds are normal.      Palpations: Abdomen is soft.   Musculoskeletal:      Cervical back: Normal range of motion and neck supple.   Neurological:      General: No focal deficit present.      Mental Status: She is alert and oriented to person, place, and time. Mental status is at baseline.   Psychiatric:         Mood and Affect: Mood normal.         Behavior: Behavior normal.             Assessment and Plan   DCIS    Pulmonary Nodules---stable since October of 2021    Osteoporosis  I would like to repeat bone density in 1 year (3/2026) to see if she had improvement after stopping AI.     B12 deficiency  Continue B12 monthly    PLAN:  Stop femara with significant interval decrease in bone density since a DXA exam 03/13/2023 despite 3 years of prolia. She is an avid runner. The risks of continued AI outweigh the benefits. She would have completed 5 years in 2/26. Tamoxifen contraindicated with her history of endometrial cancer  Continue Prolia q6m.  Due 10/2025               Alternate Left screening mammogram with MRI. (Mammogram due 8/13/2025) MRI 4/2026  Continue calcium + Vitamin D   CBC, CMP and vitamin D level    Follow up in about 6 months (around 11/28/2025) for OV/Prolia injection in October 2025.

## 2025-08-13 ENCOUNTER — HOSPITAL ENCOUNTER (OUTPATIENT)
Dept: RADIOLOGY | Facility: HOSPITAL | Age: 75
Discharge: HOME | End: 2025-08-13
Attending: INTERNAL MEDICINE
Payer: MEDICARE

## 2025-08-13 DIAGNOSIS — Z12.31 ENCOUNTER FOR SCREENING MAMMOGRAM FOR BREAST CANCER: ICD-10-CM

## 2025-08-13 PROCEDURE — 77067 SCR MAMMO BI INCL CAD: CPT | Mod: 26,52,, | Performed by: RADIOLOGY

## 2025-08-13 PROCEDURE — 77063 BREAST TOMOSYNTHESIS BI: CPT | Mod: 26,52,, | Performed by: RADIOLOGY

## 2025-08-13 PROCEDURE — 77067 SCR MAMMO BI INCL CAD: CPT | Mod: TC,52
